# Patient Record
Sex: FEMALE | HISPANIC OR LATINO | Employment: FULL TIME | URBAN - METROPOLITAN AREA
[De-identification: names, ages, dates, MRNs, and addresses within clinical notes are randomized per-mention and may not be internally consistent; named-entity substitution may affect disease eponyms.]

---

## 2017-10-10 ENCOUNTER — HOSPITAL ENCOUNTER (OUTPATIENT)
Dept: LAB | Facility: MEDICAL CENTER | Age: 32
End: 2017-10-10
Attending: OBSTETRICS & GYNECOLOGY
Payer: COMMERCIAL

## 2017-10-10 LAB
25(OH)D3 SERPL-MCNC: 28 NG/ML (ref 30–100)
ALBUMIN SERPL BCP-MCNC: 4.2 G/DL (ref 3.2–4.9)
ALBUMIN/GLOB SERPL: 1.4 G/DL
ALP SERPL-CCNC: 52 U/L (ref 30–99)
ALT SERPL-CCNC: 15 U/L (ref 2–50)
ANION GAP SERPL CALC-SCNC: 7 MMOL/L (ref 0–11.9)
APPEARANCE UR: CLEAR
AST SERPL-CCNC: 20 U/L (ref 12–45)
BACTERIA #/AREA URNS HPF: ABNORMAL /HPF
BASOPHILS # BLD AUTO: 0.6 % (ref 0–1.8)
BASOPHILS # BLD: 0.04 K/UL (ref 0–0.12)
BILIRUB SERPL-MCNC: 0.4 MG/DL (ref 0.1–1.5)
BILIRUB UR QL STRIP.AUTO: NEGATIVE
BUN SERPL-MCNC: 15 MG/DL (ref 8–22)
CALCIUM SERPL-MCNC: 9.2 MG/DL (ref 8.5–10.5)
CHLORIDE SERPL-SCNC: 103 MMOL/L (ref 96–112)
CHOLEST SERPL-MCNC: 158 MG/DL (ref 100–199)
CO2 SERPL-SCNC: 26 MMOL/L (ref 20–33)
COLOR UR: YELLOW
CREAT SERPL-MCNC: 0.81 MG/DL (ref 0.5–1.4)
CULTURE IF INDICATED INDCX: YES UA CULTURE
EOSINOPHIL # BLD AUTO: 0.03 K/UL (ref 0–0.51)
EOSINOPHIL NFR BLD: 0.5 % (ref 0–6.9)
EPI CELLS #/AREA URNS HPF: ABNORMAL /HPF
ERYTHROCYTE [DISTWIDTH] IN BLOOD BY AUTOMATED COUNT: 44.4 FL (ref 35.9–50)
GFR SERPL CREATININE-BSD FRML MDRD: >60 ML/MIN/1.73 M 2
GLOBULIN SER CALC-MCNC: 2.9 G/DL (ref 1.9–3.5)
GLUCOSE SERPL-MCNC: 83 MG/DL (ref 65–99)
GLUCOSE UR STRIP.AUTO-MCNC: NEGATIVE MG/DL
HCT VFR BLD AUTO: 43.4 % (ref 37–47)
HDLC SERPL-MCNC: 67 MG/DL
HGB BLD-MCNC: 13.8 G/DL (ref 12–16)
HYALINE CASTS #/AREA URNS LPF: ABNORMAL /LPF
IMM GRANULOCYTES # BLD AUTO: 0.02 K/UL (ref 0–0.11)
IMM GRANULOCYTES NFR BLD AUTO: 0.3 % (ref 0–0.9)
KETONES UR STRIP.AUTO-MCNC: NEGATIVE MG/DL
LDLC SERPL CALC-MCNC: 82 MG/DL
LEUKOCYTE ESTERASE UR QL STRIP.AUTO: ABNORMAL
LYMPHOCYTES # BLD AUTO: 1.79 K/UL (ref 1–4.8)
LYMPHOCYTES NFR BLD: 28.8 % (ref 22–41)
MCH RBC QN AUTO: 28.8 PG (ref 27–33)
MCHC RBC AUTO-ENTMCNC: 31.8 G/DL (ref 33.6–35)
MCV RBC AUTO: 90.4 FL (ref 81.4–97.8)
MICRO URNS: ABNORMAL
MONOCYTES # BLD AUTO: 0.38 K/UL (ref 0–0.85)
MONOCYTES NFR BLD AUTO: 6.1 % (ref 0–13.4)
NEUTROPHILS # BLD AUTO: 3.96 K/UL (ref 2–7.15)
NEUTROPHILS NFR BLD: 63.7 % (ref 44–72)
NITRITE UR QL STRIP.AUTO: NEGATIVE
NRBC # BLD AUTO: 0 K/UL
NRBC BLD AUTO-RTO: 0 /100 WBC
PH UR STRIP.AUTO: 6.5 [PH]
PLATELET # BLD AUTO: 257 K/UL (ref 164–446)
PMV BLD AUTO: 10.5 FL (ref 9–12.9)
POTASSIUM SERPL-SCNC: 4.4 MMOL/L (ref 3.6–5.5)
PROT SERPL-MCNC: 7.1 G/DL (ref 6–8.2)
PROT UR QL STRIP: NEGATIVE MG/DL
RBC # BLD AUTO: 4.8 M/UL (ref 4.2–5.4)
RBC # URNS HPF: ABNORMAL /HPF
RBC UR QL AUTO: NEGATIVE
SODIUM SERPL-SCNC: 136 MMOL/L (ref 135–145)
SP GR UR STRIP.AUTO: 1.02
T4 FREE SERPL-MCNC: 0.81 NG/DL (ref 0.53–1.43)
TRIGL SERPL-MCNC: 45 MG/DL (ref 0–149)
TSH SERPL DL<=0.005 MIU/L-ACNC: 1.36 UIU/ML (ref 0.3–3.7)
UROBILINOGEN UR STRIP.AUTO-MCNC: 0.2 MG/DL
WBC # BLD AUTO: 6.2 K/UL (ref 4.8–10.8)
WBC #/AREA URNS HPF: ABNORMAL /HPF

## 2017-10-10 PROCEDURE — 80061 LIPID PANEL: CPT

## 2017-10-10 PROCEDURE — 82306 VITAMIN D 25 HYDROXY: CPT

## 2017-10-10 PROCEDURE — 84403 ASSAY OF TOTAL TESTOSTERONE: CPT

## 2017-10-10 PROCEDURE — 81001 URINALYSIS AUTO W/SCOPE: CPT

## 2017-10-10 PROCEDURE — 36415 COLL VENOUS BLD VENIPUNCTURE: CPT

## 2017-10-10 PROCEDURE — 87086 URINE CULTURE/COLONY COUNT: CPT

## 2017-10-10 PROCEDURE — 80053 COMPREHEN METABOLIC PANEL: CPT

## 2017-10-10 PROCEDURE — 84439 ASSAY OF FREE THYROXINE: CPT

## 2017-10-10 PROCEDURE — 84443 ASSAY THYROID STIM HORMONE: CPT

## 2017-10-10 PROCEDURE — 85025 COMPLETE CBC W/AUTO DIFF WBC: CPT

## 2017-10-10 PROCEDURE — 84270 ASSAY OF SEX HORMONE GLOBUL: CPT

## 2017-10-11 LAB
BACTERIA UR CULT: NORMAL
SIGNIFICANT IND 70042: NORMAL
SOURCE SOURCE: NORMAL

## 2017-10-13 LAB
SHBG SERPL-SCNC: 70 NMOL/L (ref 30–135)
TESTOST FREE SERPL-MCNC: 2.3 PG/ML (ref 1.3–9.2)
TESTOST SERPL-MCNC: 22 NG/DL (ref 9–55)

## 2018-01-27 ENCOUNTER — OFFICE VISIT (OUTPATIENT)
Dept: URGENT CARE | Facility: PHYSICIAN GROUP | Age: 33
End: 2018-01-27
Payer: COMMERCIAL

## 2018-01-27 VITALS
DIASTOLIC BLOOD PRESSURE: 66 MMHG | HEART RATE: 91 BPM | SYSTOLIC BLOOD PRESSURE: 92 MMHG | OXYGEN SATURATION: 98 % | RESPIRATION RATE: 16 BRPM | HEIGHT: 63 IN | WEIGHT: 139 LBS | TEMPERATURE: 98.5 F | BODY MASS INDEX: 24.63 KG/M2

## 2018-01-27 DIAGNOSIS — J06.9 UPPER RESPIRATORY TRACT INFECTION, UNSPECIFIED TYPE: ICD-10-CM

## 2018-01-27 DIAGNOSIS — R07.89 CHEST WALL PAIN: ICD-10-CM

## 2018-01-27 DIAGNOSIS — R05.9 COUGH: ICD-10-CM

## 2018-01-27 PROCEDURE — 99214 OFFICE O/P EST MOD 30 MIN: CPT | Performed by: NURSE PRACTITIONER

## 2018-01-27 ASSESSMENT — ENCOUNTER SYMPTOMS
SWEATS: 0
SHORTNESS OF BREATH: 0
COUGH: 1
HEMOPTYSIS: 0
HEARTBURN: 0
RHINORRHEA: 1
WEIGHT LOSS: 0
CHILLS: 1
FEVER: 0
HEADACHES: 0
MYALGIAS: 0
WHEEZING: 0
SORE THROAT: 0

## 2018-01-27 ASSESSMENT — COPD QUESTIONNAIRES: COPD: 0

## 2018-01-28 NOTE — PROGRESS NOTES
"Subjective:      Amy Charles is a 32 y.o. female who presents with Cough (C/o unproductive cough, headache, chills, sweats, LT ear pain x2-3 wks off/on.  Chest pain possibly from cough onset last night.)      Denies past medical, surgical or family history that is significant to today's problem.   RX or OTC medications reviewed with patient today.   No Known Allergies          Cough   This is a new problem. The current episode started in the past 7 days. The problem has been unchanged. The cough is non-productive. Associated symptoms include chest pain (anterior chest wall pain 4/10 last night  NONE at this time. ), chills, nasal congestion and rhinorrhea. Pertinent negatives include no ear congestion, ear pain, fever, headaches, heartburn, hemoptysis, myalgias, postnasal drip, rash, sore throat, shortness of breath, sweats, weight loss or wheezing. Nothing aggravates the symptoms. She has tried OTC cough suppressant for the symptoms. The treatment provided mild relief. There is no history of asthma, bronchiectasis, bronchitis, COPD, emphysema, environmental allergies or pneumonia.       Review of Systems   Constitutional: Positive for chills. Negative for fever and weight loss.   HENT: Positive for rhinorrhea. Negative for ear pain, postnasal drip and sore throat.    Respiratory: Positive for cough. Negative for hemoptysis, shortness of breath and wheezing.    Cardiovascular: Positive for chest pain (anterior chest wall pain 4/10 last night  NONE at this time. ).   Gastrointestinal: Negative for heartburn.   Musculoskeletal: Negative for myalgias.   Skin: Negative for rash.   Neurological: Negative for headaches.   Endo/Heme/Allergies: Negative for environmental allergies.          Objective:     BP (!) 92/66   Pulse 91   Temp 36.9 °C (98.5 °F)   Resp 16   Ht 1.6 m (5' 3\")   Wt 63 kg (139 lb)   SpO2 98%   Breastfeeding? No   BMI 24.62 kg/m²      Physical Exam   Constitutional: She is oriented " to person, place, and time. Vital signs are normal. She appears well-developed and well-nourished.  Non-toxic appearance. She does not have a sickly appearance. She does not appear ill. No distress.   HENT:   Head: Normocephalic.   Right Ear: Hearing, external ear and ear canal normal. Tympanic membrane is injected. Tympanic membrane is not erythematous. No middle ear effusion.   Left Ear: Hearing, external ear and ear canal normal. Tympanic membrane is injected. Tympanic membrane is not erythematous.  No middle ear effusion.   Nose: Rhinorrhea present. No mucosal edema. Right sinus exhibits no maxillary sinus tenderness and no frontal sinus tenderness. Left sinus exhibits no maxillary sinus tenderness and no frontal sinus tenderness.   Mouth/Throat: Uvula is midline. Posterior oropharyngeal erythema present. No oropharyngeal exudate, posterior oropharyngeal edema or tonsillar abscesses.   Eyes: Pupils are equal, round, and reactive to light.   Neck: Trachea normal, normal range of motion and full passive range of motion without pain. Neck supple.   Cardiovascular: Normal rate and regular rhythm.  PMI is not displaced.    Pulmonary/Chest: Effort normal and breath sounds normal. No accessory muscle usage. No tachypnea. No respiratory distress. She has no decreased breath sounds. She has no wheezes. She has no rhonchi. She has no rales. She exhibits tenderness. She exhibits no crepitus, no edema and no swelling.       Abdominal: Soft. Normal appearance. There is no tenderness.   Musculoskeletal: Normal range of motion.   Lymphadenopathy:        Head (right side): No submental, no submandibular and no tonsillar adenopathy present.        Head (left side): No submental, no submandibular and no tonsillar adenopathy present.     She has no cervical adenopathy.        Right: No supraclavicular adenopathy present.        Left: No supraclavicular adenopathy present.   Neurological: She is alert and oriented to person, place,  and time. She has normal strength. Gait normal.   Skin: Skin is warm and dry.   Psychiatric: She has a normal mood and affect. Her speech is normal and behavior is normal. Thought content normal.   Nursing note and vitals reviewed.        EKG Interpretation    Interpreted by me    Rhythm: normal sinus   Rate: normal  Axis: normal  Ectopy: none  Conduction: normal  ST Segments: no acute change  T Waves: no acute change  Q Waves: none    Clinical Impression: no acute changes           Assessment/Plan:     1. Upper respiratory tract infection, unspecified type      2. Chest wall pain      3. Cough      Discussed that I felt this was viral in nature. Did not see any evidence of a bacterial process. Discussed natural progression and sx care.  Increase fluids  Increase rest  Keep well hydrated  NSAIDS OTC   Fu PRN

## 2018-05-23 ENCOUNTER — TELEPHONE (OUTPATIENT)
Dept: MEDICAL GROUP | Facility: PHYSICIAN GROUP | Age: 33
End: 2018-05-23

## 2018-05-23 NOTE — TELEPHONE ENCOUNTER
Future Appointments       Provider Department [place]    5/24/2018 5:00 PM Laura Cornell M.D. [facility]    8/31/2018 2:00 PM Laura Cornell M.D. [facility]        ESTABLISHED PATIENT PRE-VISIT PLANNING     Note: Patient will not be contacted if there is no indication to call.     1.  Reviewed notes from the last few office visits within the medical group: Yes    2.  If any orders were placed at last visit or intended to be done for this visit (i.e. 6 mos follow-up), do we have Results/Consult Notes?        •  Labs - Labs ordered, completed on 10/10/17 and results are in chart.       •  Imaging - Imaging was not ordered at last office visit.       •  Referrals - No referrals were ordered at last office visit.    3. Is this appointment scheduled as a Hospital Follow-Up? No    4.  Immunizations were updated in [employer] using WebIZ?: Yes       •  Web Iz Recommendations: FLU, HEPATITIS A  and VARICELLA (Chicken Pox)     5.  Patient is due for the following Health Maintenance Topics:   Health Maintenance Due   Topic Date Due   • PAP SMEAR  12/20/2017     6.  MDX printed for Provider? NO INS UMR     7.  Patient was informed to arrive 15 min prior to their scheduled appointment and bring in their medication bottles. Confirmed through automated call

## 2018-05-24 ENCOUNTER — OFFICE VISIT (OUTPATIENT)
Dept: MEDICAL GROUP | Facility: PHYSICIAN GROUP | Age: 33
End: 2018-05-24
Payer: COMMERCIAL

## 2018-05-24 VITALS
BODY MASS INDEX: 24.98 KG/M2 | HEART RATE: 64 BPM | SYSTOLIC BLOOD PRESSURE: 96 MMHG | WEIGHT: 141 LBS | DIASTOLIC BLOOD PRESSURE: 58 MMHG | OXYGEN SATURATION: 99 % | TEMPERATURE: 98.4 F | HEIGHT: 63 IN

## 2018-05-24 DIAGNOSIS — Z00.00 HEALTHCARE MAINTENANCE: ICD-10-CM

## 2018-05-24 DIAGNOSIS — M54.42 CHRONIC BILATERAL LOW BACK PAIN WITH BILATERAL SCIATICA: ICD-10-CM

## 2018-05-24 DIAGNOSIS — G89.29 CHRONIC BILATERAL LOW BACK PAIN WITH BILATERAL SCIATICA: ICD-10-CM

## 2018-05-24 DIAGNOSIS — R19.6 HALITOSIS: ICD-10-CM

## 2018-05-24 DIAGNOSIS — M54.41 CHRONIC BILATERAL LOW BACK PAIN WITH BILATERAL SCIATICA: ICD-10-CM

## 2018-05-24 DIAGNOSIS — R05.9 COUGH IN ADULT: ICD-10-CM

## 2018-05-24 DIAGNOSIS — E55.9 VITAMIN D DEFICIENCY: ICD-10-CM

## 2018-05-24 PROCEDURE — 99204 OFFICE O/P NEW MOD 45 MIN: CPT | Performed by: INTERNAL MEDICINE

## 2018-05-24 RX ORDER — ERGOCALCIFEROL 1.25 MG/1
50000 CAPSULE ORAL
Qty: 12 CAP | Refills: 3 | Status: SHIPPED | OUTPATIENT
Start: 2018-05-24 | End: 2018-11-28

## 2018-05-24 RX ORDER — BROMPHENIRAMINE MALEATE, PSEUDOEPHEDRINE HYDROCHLORIDE, AND DEXTROMETHORPHAN HYDROBROMIDE 2; 30; 10 MG/5ML; MG/5ML; MG/5ML
5 SYRUP ORAL 3 TIMES DAILY PRN
Qty: 840 ML | Refills: 1 | Status: SHIPPED | OUTPATIENT
Start: 2018-05-24 | End: 2018-08-31

## 2018-05-24 ASSESSMENT — PATIENT HEALTH QUESTIONNAIRE - PHQ9: CLINICAL INTERPRETATION OF PHQ2 SCORE: 0

## 2018-05-25 NOTE — ASSESSMENT & PLAN NOTE
This is a chronic health problem that is well controlled with current medications and lifestyle measures. Had PT in the past and on PRN Ibuprofen. Had MRI in 2015 positive for Minimal lumbar spondylotic change at the L5-S1 level.

## 2018-05-25 NOTE — PROGRESS NOTES
CC:  To establish care with new PCP, darby.    HISTORY OF THE PRESENT ILLNESS: Patient is a 32 y.o. female. This pleasant patient is here today to establish care with new PCP and discuss on her medical conditions as mentioned in history of below.    Health Maintenance: Completed      Cough in adult  Recent URI with pharyngitis for 3 weeks. Having residual cough. Productive cough which is white in color. Denies fever, chills or dyspnea.    Chronic low back pain  This is a chronic health problem that is well controlled with current medications and lifestyle measures. Had PT in the past and on PRN Ibuprofen. Had MRI in 2015 positive for Minimal lumbar spondylotic change at the L5-S1 level.    Halitosis  This is a new problem for many years. Had dental check 2-3 months back which was normal.    Vitamin D deficiency  Last blood work in 10/2017 was low. Not on any supplements.    Healthcare maintenance  Pap smear at this time, already had IUD placement done with gynecology. We will be setting up an appointment with gynecologist.    PHQ score 0, BMI within normal limits, no tobacco, no fall injuries    Allergies: Patient has no known allergies.    Current Outpatient Prescriptions Ordered in [facility]   Medication Sig Dispense Refill   • brompheniramine-pseudoephedrine-DM 30-2-10 MG/5ML syrup Take 5 mL by mouth 3 times a day as needed. 840 mL 1   • vitamin D, Ergocalciferol, (DRISDOL) 26891 units Cap capsule Take 1 Cap by mouth every 7 days. 12 Cap 3   • ibuprofen (MOTRIN) 600 MG TABS Take 1 Tab by mouth every 6 hours as needed (Cramping). 30 Tab 1     No current Epic-ordered facility-administered medications on file.        Past Medical History:   Diagnosis Date   • Anxiety    • Depression    • Headache(784.0)    • IBD (inflammatory bowel disease)        Past Surgical History:   Procedure Laterality Date   • DENTAL EXTRACTION(S)         Social History   Substance Use Topics   • Smoking status: Never Smoker   • Smokeless  tobacco: Never Used   • Alcohol use No      Comment: rarely       Social History     Social History Narrative   • No narrative on file       Family History   Problem Relation Age of Onset   • Hypertension Mother    • Hyperlipidemia Mother    • Heart Disease Mother    • Diabetes Maternal Aunt    • Heart Disease Maternal Aunt    • Hypertension Maternal Aunt    • Hyperlipidemia Maternal Aunt    • Hyperlipidemia Maternal Uncle    • Hypertension Maternal Uncle    • Heart Disease Maternal Uncle    • Diabetes Maternal Uncle    • Arthritis Maternal Grandmother    • Diabetes Maternal Grandmother    • Hypertension Maternal Grandmother    • Hyperlipidemia Maternal Grandmother    • Heart Disease Maternal Grandmother    • Lung Disease Maternal Grandfather        ROS:     - Constitutional: Negative for fever, chills, unexpected weight change, and fatigue/generalized weakness.     - HEENT: Negative for headaches, vision changes, hearing changes, ear pain, ear discharge, rhinorrhea, sinus congestion, sore throat, and neck pain.      - Respiratory: Positive for productive cough with clear sputum. Negative for chest congestion, dyspnea, wheezing, and crackles.      - Cardiovascular: Negative for chest pain, palpitations, orthopnea, and bilateral lower extremity edema.     - Gastrointestinal: Negative for heartburn, nausea, vomiting, abdominal pain, hematochezia, melena, diarrhea, constipation, and greasy/foul-smelling stools.     - Genitourinary: Negative for dysuria, polyuria, hematuria, pyuria, urinary urgency, and urinary incontinence.     - Musculoskeletal: Negative for myalgias, back pain, and joint pain.     - Skin: Negative for rash, itching, cyanotic skin color change.     - Neurological: Negative for dizziness, tingling, tremors, focal sensory deficit, focal weakness and headaches.     - Endo/Heme/Allergies: Does not bruise/bleed easily.     - Psychiatric/Behavioral: Negative for depression, suicidal/homicidal ideation and  "memory loss.      Last lab work done in October 2017 reviewed and discussed with the patient.    Exam: Blood pressure (!) 96/58, pulse 64, temperature 36.9 °C (98.4 °F), height 1.6 m (5' 3\"), weight 64 kg (141 lb), SpO2 99 %. Body mass index is 24.98 kg/m².    General: Normal appearing. No distress.  HEENT: Normocephalic. Eyes conjunctiva clear lids without ptosis, pupils equal and reactive to light accommodation, ears normal shape and contour, canals are clear bilaterally, tympanic membranes are benign, nasal mucosa benign, oropharynx is without erythema, edema or exudates.   Neck: Supple without JVD or bruit. Thyroid is not enlarged.  Pulmonary: Clear to ausculation.  Normal effort. No rales, ronchi, or wheezing.  Cardiovascular: Regular rate and rhythm without murmur. Carotid and radial pulses are intact and equal bilaterally.  Abdomen: Soft, nontender, nondistended. Normal bowel sounds. Liver and spleen are not palpable  Neurologic: Grossly nonfocal  Lymph: No cervical, supraclavicular or axillary lymph nodes are palpable  Skin: Warm and dry.  No obvious lesions.  Musculoskeletal: Normal gait. No extremity cyanosis, clubbing, or edema.  Psych: Normal mood and affect. Alert and oriented x3. Judgment and insight is normal.      Please note that this dictation was created using voice recognition software. I have made every reasonable attempt to correct obvious errors, but I expect that there are errors of grammar and possibly content that I did not discover before finalizing the note.      Assessment/Plan  1. Halitosis  No dental infections as per my exam, given the symptoms will check for H. pylori antibody.  - H.PYLORI AB SCREEN; Future    2. Cough in adult  Residual cough after the recent upper respiratory infection, will give her symptomatic treatment.  - brompheniramine-pseudoephedrine-DM 30-2-10 MG/5ML syrup; Take 5 mL by mouth 3 times a day as needed.  Dispense: 840 mL; Refill: 1    3. Chronic bilateral low " back pain with bilateral sciatica  Continue when necessary ibuprofen 600 mg 3 times a day.    4. Vitamin D deficiency  Given vitamin D deficiency in the last lab work patient not on any supplements will give her high-dose vitamin D at this time.  - vitamin D, Ergocalciferol, (DRISDOL) 96438 units Cap capsule; Take 1 Cap by mouth every 7 days.  Dispense: 12 Cap; Refill: 3

## 2018-05-25 NOTE — ASSESSMENT & PLAN NOTE
Pap smear at this time, already had IUD placement done with gynecology. We will be setting up an appointment with gynecologist.

## 2018-05-25 NOTE — ASSESSMENT & PLAN NOTE
Recent URI with pharyngitis for 3 weeks. Having residual cough. Productive cough which is white in color. Denies fever, chills or dyspnea.

## 2018-05-29 DIAGNOSIS — R19.6 HALITOSIS: ICD-10-CM

## 2018-06-02 ENCOUNTER — HOSPITAL ENCOUNTER (OUTPATIENT)
Dept: LAB | Facility: MEDICAL CENTER | Age: 33
End: 2018-06-02
Attending: INTERNAL MEDICINE
Payer: COMMERCIAL

## 2018-06-02 DIAGNOSIS — R19.6 HALITOSIS: ICD-10-CM

## 2018-06-02 PROCEDURE — 83013 H PYLORI (C-13) BREATH: CPT

## 2018-06-05 LAB — UREA BREATH TEST QL: NEGATIVE

## 2018-06-06 ENCOUNTER — TELEPHONE (OUTPATIENT)
Dept: MEDICAL GROUP | Facility: PHYSICIAN GROUP | Age: 33
End: 2018-06-06

## 2018-06-06 NOTE — LETTER
June 11, 2018        Amy Charles  [address]  [facility] [address]        Dear Amy:    Your H.Pylori breath test is negative.      If you have any questions or concerns, please don't hesitate to call.        Sincerely,      Laura Cornell M.D.      Electronically Signed

## 2018-06-06 NOTE — TELEPHONE ENCOUNTER
----- Message from Laura Cornell M.D. sent at 6/5/2018 11:59 PM PDT -----  Your H.Pylori breath test is negative.  Laura Cornell M.D.

## 2018-08-30 ENCOUNTER — TELEPHONE (OUTPATIENT)
Dept: MEDICAL GROUP | Facility: PHYSICIAN GROUP | Age: 33
End: 2018-08-30

## 2018-08-30 NOTE — TELEPHONE ENCOUNTER
Future Appointments       Provider Department [facility]    8/31/2018 2:20 PM Laura Cornell M.D. [facility]        ESTABLISHED PATIENT PRE-VISIT PLANNING     Note: Patient will not be contacted if there is no indication to call.     1.  Reviewed notes from the last few office visits within the medical group: Yes    2.  If any orders were placed at last visit or intended to be done for this visit (i.e. 6 mos follow-up), do we have Results/Consult Notes?        •  Labs - Labs ordered, NOT completed. Patient advised to complete prior to next appointment.       •  Imaging - Imaging was not ordered at last office visit.       •  Referrals - No referrals were ordered at last office visit.    3. Is this appointment scheduled as a Hospital Follow-Up? No    4.  Immunizations were updated in [employer] using WebIZ?: Yes       •  Web Iz Recommendations: FLU, HEPATITIS A  and VARICELLA (Chicken Pox)     5.  Patient is due for the following Health Maintenance Topics:   Health Maintenance Due   Topic Date Due   • PAP SMEAR  12/20/2017   • IMM INFLUENZA (1) 09/01/2018       6.  MDX printed for Provider? NO INS UMR     7.  Patient was informed to arrive 15 min prior to their scheduled appointment and bring in their medication bottles. Confirmed through automated call

## 2018-08-31 ENCOUNTER — OFFICE VISIT (OUTPATIENT)
Dept: MEDICAL GROUP | Facility: PHYSICIAN GROUP | Age: 33
End: 2018-08-31
Payer: COMMERCIAL

## 2018-08-31 VITALS
DIASTOLIC BLOOD PRESSURE: 58 MMHG | SYSTOLIC BLOOD PRESSURE: 94 MMHG | HEIGHT: 63 IN | WEIGHT: 138 LBS | HEART RATE: 60 BPM | TEMPERATURE: 98.9 F | BODY MASS INDEX: 24.45 KG/M2 | OXYGEN SATURATION: 98 %

## 2018-08-31 DIAGNOSIS — K58.2 IRRITABLE BOWEL SYNDROME WITH BOTH CONSTIPATION AND DIARRHEA: ICD-10-CM

## 2018-08-31 DIAGNOSIS — M51.36 DDD (DEGENERATIVE DISC DISEASE), LUMBAR: ICD-10-CM

## 2018-08-31 DIAGNOSIS — Z97.5 IUD (INTRAUTERINE DEVICE) IN PLACE: ICD-10-CM

## 2018-08-31 DIAGNOSIS — M21.611 BUNION OF GREAT TOE OF RIGHT FOOT: ICD-10-CM

## 2018-08-31 DIAGNOSIS — J35.01 CHRONIC TONSILLITIS: ICD-10-CM

## 2018-08-31 PROBLEM — R05.9 COUGH IN ADULT: Status: RESOLVED | Noted: 2018-05-24 | Resolved: 2018-08-31

## 2018-08-31 PROBLEM — R10.9 CHRONIC ABDOMINAL PAIN: Status: ACTIVE | Noted: 2018-08-31

## 2018-08-31 PROBLEM — K59.00 CONSTIPATION: Status: ACTIVE | Noted: 2018-08-31

## 2018-08-31 PROBLEM — G89.29 CHRONIC ABDOMINAL PAIN: Status: ACTIVE | Noted: 2018-08-31

## 2018-08-31 PROCEDURE — 99214 OFFICE O/P EST MOD 30 MIN: CPT | Performed by: INTERNAL MEDICINE

## 2018-08-31 RX ORDER — POLYETHYLENE GLYCOL 3350 17 G/17G
17 POWDER, FOR SOLUTION ORAL DAILY
Qty: 10 EACH | Refills: 3 | Status: SHIPPED | OUTPATIENT
Start: 2018-08-31 | End: 2020-01-24

## 2018-08-31 NOTE — ASSESSMENT & PLAN NOTE
This is a chronic health problem that is well controlled with current medications and lifestyle measures. Patient is having greater than 3 episodes of recurrent tonsillitis and requesting for removal of the tonsils.

## 2018-08-31 NOTE — PATIENT INSTRUCTIONS
Irritable Bowel Syndrome, Adult  Irritable bowel syndrome (IBS) is not one specific disease. It is a group of symptoms that affects the organs responsible for digestion (gastrointestinal or GI tract).  To regulate how your GI tract works, your body sends signals back and forth between your intestines and your brain. If you have IBS, there may be a problem with these signals. As a result, your GI tract does not function normally. Your intestines may become more sensitive and overreact to certain things. This is especially true when you eat certain foods or when you are under stress.  There are four types of IBS. These may be determined based on the consistency of your stool:  · IBS with diarrhea.  · IBS with constipation.  · Mixed IBS.  · Unsubtyped IBS.  It is important to know which type of IBS you have. Some treatments are more likely to be helpful for certain types of IBS.  What are the causes?  The exact cause of IBS is not known.  What increases the risk?  You may have a higher risk of IBS if:  · You are a woman.  · You are younger than 45 years old.  · You have a family history of IBS.  · You have mental health problems.  · You have had bacterial infection of your GI tract.  What are the signs or symptoms?  Symptoms of IBS vary from person to person. The main symptom is abdominal pain or discomfort. Additional symptoms usually include one or more of the following:  · Diarrhea, constipation, or both.  · Abdominal swelling or bloating.  · Feeling full or sick after eating a small or regular-size meal.  · Frequent gas.  · Mucus in the stool.  · A feeling of having more stool left after a bowel movement.  Symptoms tend to come and go. They may be associated with stress, psychiatric conditions, or nothing at all.  How is this diagnosed?  There is no specific test to diagnose IBS. Your health care provider will make a diagnosis based on a physical exam, medical history, and your symptoms. You may have other tests to  rule out other conditions that may be causing your symptoms. These may include:  · Blood tests.  · X-rays.  · CT scan.  · Endoscopy and colonoscopy. This is a test in which your GI tract is viewed with a long, thin, flexible tube.  How is this treated?  There is no cure for IBS, but treatment can help relieve symptoms. IBS treatment often includes:  · Changes to your diet, such as:  ¨ Eating more fiber.  ¨ Avoiding foods that cause symptoms.  ¨ Drinking more water.  ¨ Eating regular, medium-sized portioned meals.  · Medicines. These may include:  ¨ Fiber supplements if you have constipation.  ¨ Medicine to control diarrhea (antidiarrheal medicines).  ¨ Medicine to help control muscle spasms in your GI tract (antispasmodic medicines).  ¨ Medicines to help with any mental health issues, such as antidepressants or tranquilizers.  · Therapy.  ¨ Talk therapy may help with anxiety, depression, or other mental health issues that can make IBS symptoms worse.  · Stress reduction.  ¨ Managing your stress can help keep symptoms under control.  Follow these instructions at home:  · Take medicines only as directed by your health care provider.  · Eat a healthy diet.  ¨ Avoid foods and drinks with added sugar.  ¨ Include more whole grains, fruits, and vegetables gradually into your diet. This may be especially helpful if you have IBS with constipation.  ¨ Avoid any foods and drinks that make your symptoms worse. These may include dairy products and caffeinated or carbonated drinks.  ¨ Do not eat large meals.  ¨ Drink enough fluid to keep your urine clear or pale yellow.  · Exercise regularly. Ask your health care provider for recommendations of good activities for you.  · Keep all follow-up visits as directed by your health care provider. This is important.  Contact a health care provider if:  · You have constant pain.  · You have trouble or pain with swallowing.  · You have worsening diarrhea.  Get help right away if:  · You  have severe and worsening abdominal pain.  · You have diarrhea and:  ¨ You have a rash, stiff neck, or severe headache.  ¨ You are irritable, sleepy, or difficult to awaken.  ¨ You are weak, dizzy, or extremely thirsty.  · You have bright red blood in your stool or you have black tarry stools.  · You have unusual abdominal swelling that is painful.  · You vomit continuously.  · You vomit blood (hematemesis).  · You have both abdominal pain and a fever.  This information is not intended to replace advice given to you by your health care provider. Make sure you discuss any questions you have with your health care provider.  Document Released: 12/18/2006 Document Revised: 05/19/2017 Document Reviewed: 09/04/2015  [employer] Patient Education © 2017 [employer]vier Inc.  ================    Diet for Irritable Bowel Syndrome  When you have irritable bowel syndrome (IBS), the foods you eat and your eating habits are very important. IBS may cause various symptoms, such as abdominal pain, constipation, or diarrhea. Choosing the right foods can help ease discomfort caused by these symptoms. Work with your health care provider and dietitian to find the best eating plan to help control your symptoms.  WHAT GENERAL GUIDELINES DO I NEED TO FOLLOW?  · Keep a food diary. This will help you identify foods that cause symptoms. Write down:  ¨ What you eat and when.  ¨ What symptoms you have.  ¨ When symptoms occur in relation to your meals.  · Avoid foods that cause symptoms. Talk with your dietitian about other ways to get the same nutrients that are in these foods.  · Eat more foods that contain fiber. Take a fiber supplement if directed by your dietitian.  · Eat your meals slowly, in a relaxed setting.  · Aim to eat 5-6 small meals per day. Do not skip meals.  · Drink enough fluids to keep your urine clear or pale yellow.  · Ask your health care provider if you should take an over-the-counter probiotic during flare-ups to help  restore healthy gut bacteria.  · If you have cramping or diarrhea, try making your meals low in fat and high in carbohydrates. Examples of carbohydrates are pasta, rice, whole grain breads and cereals, fruits, and vegetables.  · If dairy products cause your symptoms to flare up, try eating less of them. You might be able to handle yogurt better than other dairy products because it contains bacteria that help with digestion.  WHAT FOODS ARE NOT RECOMMENDED?  The following are some foods and drinks that may worsen your symptoms:  · Fatty foods, such as French fries.  · Milk products, such as cheese or ice cream.  · Chocolate.  · Alcohol.  · Products with caffeine, such as coffee.  · Carbonated drinks, such as soda.  The items listed above may not be a complete list of foods and beverages to avoid. Contact your dietitian for more information.  WHAT FOODS ARE GOOD SOURCES OF FIBER?  Your health care provider or dietitian may recommend that you eat more foods that contain fiber. Fiber can help reduce constipation and other IBS symptoms. Add foods with fiber to your diet a little at a time so that your body can get used to them. Too much fiber at once might cause gas and swelling of your abdomen. The following are some foods that are good sources of fiber:  · Apples.  · Peaches.  · Pears.  · Berries.  · Figs.  · Broccoli (raw).  · Cabbage.  · Carrots.  · Raw peas.  · Kidney beans.  · Lima beans.  · Whole grain bread.  · Whole grain cereal.  FOR MORE INFORMATION   International Foundation for Functional Gastrointestinal Disorders: www.iffgd.org  National [place] of Diabetes and Digestive and Kidney Diseases: www.niddk.nih.gov/health-information/health-topics/digestive-diseases/ibs/Pages/facts.aspx     This information is not intended to replace advice given to you by your health care provider. Make sure you discuss any questions you have with your health care provider.     Document Released: 03/09/2005 Document Revised:  01/08/2016 Document Reviewed: 03/20/2015  Elsevier Interactive Patient Education ©2016 [employer]    ==============================    You tube MINDFULNESS exercises.  =============================

## 2018-08-31 NOTE — ASSESSMENT & PLAN NOTE
This is a chronic health problem that is well controlled with current medications and lifestyle measures. Last MRI in 2015 positive for Minimal lumbar spondylotic change at the L5-S1 level. Currently on Ibuprofen 600 mg daily PRN needing rarely. Had a PT session in 2015 but not practicing at home

## 2018-08-31 NOTE — ASSESSMENT & PLAN NOTE
This is a chronic health problem that is uncontrolled with current medications and lifestyle measures. Mostly lower abdomen. Has Constipation and diarrhea alternatively.

## 2018-08-31 NOTE — PROGRESS NOTES
CC: Follow-up visit, constipation.    HISTORY OF PRESENT ILLNESS: Patient is a 33 y.o. female established patient who presents today to discuss her medical conditions as mentioned in history of present illness below.    Health Maintenance: Completed    DDD (degenerative disc disease), lumbar  This is a chronic health problem that is well controlled with current medications and lifestyle measures. Last MRI in 2015 positive for Minimal lumbar spondylotic change at the L5-S1 level. Currently on Ibuprofen 600 mg daily PRN needing rarely. Had a PT session in 2015 but not practicing at home     IUD (intrauterine device) in place  Placed IUD this year Mirena good for 5 yrs. Follows  Gynecologist    Healthcare maintenance  PAP smear done recently this year 05/2018 will get the records.    Constipation  This is a chronic health problem that is uncontrolled with current medications and lifestyle measures. Takes fiber food from grocery. Requesting referral to GI . [facility] had colonoscopy in 3 yrs back , and was Colitis as per the patient. Should get the records.    Irritable bowel syndrome with both constipation and diarrhea  This is a chronic health problem that is uncontrolled with current medications and lifestyle measures. Mostly lower abdomen. Has Constipation and diarrhea alternatively.     Chronic tonsillitis  This is a chronic health problem that is well controlled with current medications and lifestyle measures. Patient is having greater than 3 episodes of recurrent tonsillitis and requesting for removal of the tonsils.      PHQ score 0, BMI within normal limits, no tobacco, no fall injuries.    Patient Active Problem List    Diagnosis Date Noted   • IUD (intrauterine device) in place 08/31/2018   • Irritable bowel syndrome with both constipation and diarrhea 08/31/2018   • Chronic tonsillitis 08/31/2018   • Halitosis 05/24/2018   • Vitamin D deficiency 05/24/2018   • Healthcare maintenance  05/24/2018   • Lumbosacral spondylosis 02/25/2016   • DDD (degenerative disc disease), lumbar 02/25/2016   • Chronic low back pain 02/25/2016   • Lumbar radiculopathy 02/25/2016      Allergies:Patient has no known allergies.    Current Outpatient Prescriptions   Medication Sig Dispense Refill   • polyethylene glycol/lytes (MIRALAX) Pack Take 1 Packet by mouth every day. 10 Each 3   • vitamin D, Ergocalciferol, (DRISDOL) 73536 units Cap capsule Take 1 Cap by mouth every 7 days. 12 Cap 3   • ibuprofen (MOTRIN) 600 MG TABS Take 1 Tab by mouth every 6 hours as needed (Cramping). 30 Tab 1     No current facility-administered medications for this visit.        Social History   Substance Use Topics   • Smoking status: Never Smoker   • Smokeless tobacco: Never Used   • Alcohol use No      Comment: rarely     Social History     Social History Narrative   • No narrative on file       Family History   Problem Relation Age of Onset   • Hypertension Mother    • Hyperlipidemia Mother    • Heart Disease Mother    • Diabetes Maternal Aunt    • Heart Disease Maternal Aunt    • Hypertension Maternal Aunt    • Hyperlipidemia Maternal Aunt    • Hyperlipidemia Maternal Uncle    • Hypertension Maternal Uncle    • Heart Disease Maternal Uncle    • Diabetes Maternal Uncle    • Arthritis Maternal Grandmother    • Diabetes Maternal Grandmother    • Hypertension Maternal Grandmother    • Hyperlipidemia Maternal Grandmother    • Heart Disease Maternal Grandmother    • Lung Disease Maternal Grandfather         ROS:     - Constitutional:  Negative for fever, chills, unexpected weight change, and fatigue/generalized weakness.    - HEENT:  Negative for headaches, vision changes, hearing changes, ear pain, ear discharge, rhinorrhea, sinus congestion, sore throat, and neck pain.      - Respiratory: Negative for cough, sputum production, chest congestion, dyspnea, wheezing, and crackles.      - Cardiovascular: Negative for chest pain, palpitations,  "orthopnea, and bilateral lower extremity edema.     - Gastrointestinal: Positive for alternating constipation and diarrhea with associated intermittent abdominal pain Negative for heartburn, nausea, vomiting,hematochezia, melena and greasy/foul-smelling stools.     - Genitourinary: Negative for dysuria, polyuria, hematuria, pyuria, urinary urgency, and urinary incontinence.     - Musculoskeletal: Positive for intermittent back pain Negative for myalgias and joint pain.     - Skin: Negative for rash, itching, cyanotic skin color change.     - Neurological: Negative for dizziness, tingling, tremors, focal sensory deficit, focal weakness and headaches.     - Endo/Heme/Allergies: Does not bruise/bleed easily.     - Psychiatric/Behavioral: Negative for depression, suicidal/homicidal ideation and memory loss.        Last lab work done in October 2017 reviewed and discussed with the patient. Not due for new lab work until October 2018.    Exam:    Blood pressure (!) 94/58, pulse 60, temperature 37.2 °C (98.9 °F), height 1.6 m (5' 3\"), weight 62.6 kg (138 lb), SpO2 98 %. Body mass index is 24.45 kg/m².    General:  Well nourished, well developed female in NAD  Head is grossly normal. Positive for bilateral tonsillar enlargement, no erythema or swelling.  Neck: Supple without JVD or bruit. Thyroid is not enlarged. Positive for bilateral jugulodigastric lymph node enlargement.  Pulmonary: Clear to ausculation and percussion.  Normal effort. No rales, ronchi, or wheezing.  Cardiovascular: Regular rate and rhythm without murmur. Carotid and radial pulses are intact and equal bilaterally.  Extremities: no clubbing, cyanosis, or edema. Positive for right great toe bunion.   Abdominal exam : Soft, tenderness in the left lower quadrant and right lower quadrant and also hypogastric regions. No rigidity.      Please note that this dictation was created using voice recognition software. I have made every reasonable attempt to correct " obvious errors, but I expect that there are errors of grammar and possibly content that I did not discover before finalizing the note.    Assessment/Plan:  1. Irritable bowel syndrome with both constipation and diarrhea  Uncontrolled, offered her new medication Linzess which she refuses to start. She wants to go back to her gastroenterology at [facility] where she had a previous colonoscopy and wants to follow only the recommendations. We will give her MiraLAX whenever she has constipation, new referral to gastroenterology as it was more than 3 years.- REFERRAL TO GASTROENTEROLOGY  - polyethylene glycol/lytes (MIRALAX) Pack; Take 1 Packet by mouth every day.  Dispense: 10 Each; Refill: 3    2. Chronic tonsillitis  Stable, given her recurrent tonsillitis more than 3 times a year we will do referral to ENT for possible need of removal.  - REFERRAL TO ENT    3. DDD (degenerative disc disease), lumbar  Continue over-the-counter ibuprofen when necessary for pain. Given instructions to practice the physiotherapy exercises which was already talked to her in 2015. Offered her new physical therapy referral which she refused.    4. IUD (intrauterine device) in place  Follows with gynecology when replacement if needed.    5. Bunion of great toe of right foot  Uncontrolled pain in the bunion, given instructions on the change of footwear. Will give referral to podiatry for further management.  - REFERRAL TO PODIATRY

## 2018-08-31 NOTE — ASSESSMENT & PLAN NOTE
This is a chronic health problem that is uncontrolled with current medications and lifestyle measures. Takes fiber food from grocery. Requesting referral to GI . [facility] had colonoscopy in 3 yrs back , and was Colitis as per the patient. Should get the records.

## 2018-10-03 ENCOUNTER — IMMUNIZATION (OUTPATIENT)
Dept: SOCIAL WORK | Facility: CLINIC | Age: 33
End: 2018-10-03

## 2018-10-03 DIAGNOSIS — Z23 NEED FOR VACCINATION: ICD-10-CM

## 2018-10-03 PROCEDURE — 90686 IIV4 VACC NO PRSV 0.5 ML IM: CPT | Performed by: REGISTERED NURSE

## 2018-11-28 ENCOUNTER — APPOINTMENT (OUTPATIENT)
Dept: ADMISSIONS | Facility: MEDICAL CENTER | Age: 33
End: 2018-11-28
Attending: OTOLARYNGOLOGY
Payer: COMMERCIAL

## 2018-11-28 RX ORDER — VITAMIN E 268 MG
400 CAPSULE ORAL DAILY
Status: ON HOLD | COMMUNITY
End: 2018-12-05

## 2018-12-05 ENCOUNTER — HOSPITAL ENCOUNTER (OUTPATIENT)
Facility: MEDICAL CENTER | Age: 33
End: 2018-12-05
Attending: OTOLARYNGOLOGY | Admitting: OTOLARYNGOLOGY
Payer: COMMERCIAL

## 2018-12-05 VITALS
RESPIRATION RATE: 16 BRPM | HEIGHT: 63 IN | DIASTOLIC BLOOD PRESSURE: 90 MMHG | HEART RATE: 76 BPM | SYSTOLIC BLOOD PRESSURE: 150 MMHG | OXYGEN SATURATION: 96 % | WEIGHT: 138.67 LBS | BODY MASS INDEX: 24.57 KG/M2 | TEMPERATURE: 97.6 F

## 2018-12-05 DIAGNOSIS — J35.01 CHRONIC TONSILLITIS: ICD-10-CM

## 2018-12-05 LAB
B-HCG FREE SERPL-ACNC: <5 MIU/ML
IHCGL IHCGL: NEGATIVE MIU/ML
PATHOLOGY CONSULT NOTE: NORMAL

## 2018-12-05 PROCEDURE — 700102 HCHG RX REV CODE 250 W/ 637 OVERRIDE(OP): Performed by: ANESTHESIOLOGY

## 2018-12-05 PROCEDURE — 501838 HCHG SUTURE GENERAL: Performed by: OTOLARYNGOLOGY

## 2018-12-05 PROCEDURE — 160048 HCHG OR STATISTICAL LEVEL 1-5: Performed by: OTOLARYNGOLOGY

## 2018-12-05 PROCEDURE — 700111 HCHG RX REV CODE 636 W/ 250 OVERRIDE (IP): Performed by: ANESTHESIOLOGY

## 2018-12-05 PROCEDURE — 160036 HCHG PACU - EA ADDL 30 MINS PHASE I: Performed by: OTOLARYNGOLOGY

## 2018-12-05 PROCEDURE — 160046 HCHG PACU - 1ST 60 MINS PHASE II: Performed by: OTOLARYNGOLOGY

## 2018-12-05 PROCEDURE — 160027 HCHG SURGERY MINUTES - 1ST 30 MINS LEVEL 2: Performed by: OTOLARYNGOLOGY

## 2018-12-05 PROCEDURE — A9270 NON-COVERED ITEM OR SERVICE: HCPCS | Performed by: ANESTHESIOLOGY

## 2018-12-05 PROCEDURE — 700111 HCHG RX REV CODE 636 W/ 250 OVERRIDE (IP)

## 2018-12-05 PROCEDURE — 84702 CHORIONIC GONADOTROPIN TEST: CPT

## 2018-12-05 PROCEDURE — 160038 HCHG SURGERY MINUTES - EA ADDL 1 MIN LEVEL 2: Performed by: OTOLARYNGOLOGY

## 2018-12-05 PROCEDURE — 160025 RECOVERY II MINUTES (STATS): Performed by: OTOLARYNGOLOGY

## 2018-12-05 PROCEDURE — 501424 HCHG SPONGE, TONSIL: Performed by: OTOLARYNGOLOGY

## 2018-12-05 PROCEDURE — 160002 HCHG RECOVERY MINUTES (STAT): Performed by: OTOLARYNGOLOGY

## 2018-12-05 PROCEDURE — 502692 HCHG DRESSING, NASOPORE 8CM: Performed by: OTOLARYNGOLOGY

## 2018-12-05 PROCEDURE — 88304 TISSUE EXAM BY PATHOLOGIST: CPT

## 2018-12-05 PROCEDURE — 700102 HCHG RX REV CODE 250 W/ 637 OVERRIDE(OP)

## 2018-12-05 PROCEDURE — 160047 HCHG PACU  - EA ADDL 30 MINS PHASE II: Performed by: OTOLARYNGOLOGY

## 2018-12-05 PROCEDURE — 160009 HCHG ANES TIME/MIN: Performed by: OTOLARYNGOLOGY

## 2018-12-05 PROCEDURE — 160035 HCHG PACU - 1ST 60 MINS PHASE I: Performed by: OTOLARYNGOLOGY

## 2018-12-05 PROCEDURE — 502573 HCHG PACK, ENT: Performed by: OTOLARYNGOLOGY

## 2018-12-05 PROCEDURE — 500257: Performed by: OTOLARYNGOLOGY

## 2018-12-05 PROCEDURE — 700101 HCHG RX REV CODE 250

## 2018-12-05 RX ORDER — HYDROCODONE BITARTRATE AND ACETAMINOPHEN 5; 325 MG/1; MG/1
1-2 TABLET ORAL EVERY 4 HOURS PRN
Qty: 62 TAB | Refills: 0 | Status: SHIPPED | OUTPATIENT
Start: 2018-12-05 | End: 2018-12-12

## 2018-12-05 RX ORDER — OXYCODONE HYDROCHLORIDE 5 MG/1
10 TABLET ORAL
Status: DISCONTINUED | OUTPATIENT
Start: 2018-12-05 | End: 2018-12-05 | Stop reason: HOSPADM

## 2018-12-05 RX ORDER — HALOPERIDOL 5 MG/ML
1 INJECTION INTRAMUSCULAR
Status: DISCONTINUED | OUTPATIENT
Start: 2018-12-05 | End: 2018-12-05 | Stop reason: HOSPADM

## 2018-12-05 RX ORDER — SODIUM CHLORIDE, SODIUM LACTATE, POTASSIUM CHLORIDE, CALCIUM CHLORIDE 600; 310; 30; 20 MG/100ML; MG/100ML; MG/100ML; MG/100ML
INJECTION, SOLUTION INTRAVENOUS CONTINUOUS
Status: DISCONTINUED | OUTPATIENT
Start: 2018-12-05 | End: 2018-12-05 | Stop reason: HOSPADM

## 2018-12-05 RX ORDER — HYDROMORPHONE HYDROCHLORIDE 1 MG/ML
0.1 INJECTION, SOLUTION INTRAMUSCULAR; INTRAVENOUS; SUBCUTANEOUS
Status: DISCONTINUED | OUTPATIENT
Start: 2018-12-05 | End: 2018-12-05 | Stop reason: HOSPADM

## 2018-12-05 RX ORDER — ONDANSETRON 2 MG/ML
4 INJECTION INTRAMUSCULAR; INTRAVENOUS
Status: DISCONTINUED | OUTPATIENT
Start: 2018-12-05 | End: 2018-12-05 | Stop reason: HOSPADM

## 2018-12-05 RX ORDER — OXYCODONE HCL 5 MG/5 ML
10 SOLUTION, ORAL ORAL
Status: COMPLETED | OUTPATIENT
Start: 2018-12-05 | End: 2018-12-05

## 2018-12-05 RX ORDER — OXYMETAZOLINE HYDROCHLORIDE 0.05 G/100ML
SPRAY NASAL
Status: DISCONTINUED | OUTPATIENT
Start: 2018-12-05 | End: 2018-12-05 | Stop reason: HOSPADM

## 2018-12-05 RX ORDER — SODIUM CHLORIDE, SODIUM LACTATE, POTASSIUM CHLORIDE, CALCIUM CHLORIDE 600; 310; 30; 20 MG/100ML; MG/100ML; MG/100ML; MG/100ML
INJECTION, SOLUTION INTRAVENOUS ONCE
Status: COMPLETED | OUTPATIENT
Start: 2018-12-05 | End: 2018-12-05

## 2018-12-05 RX ORDER — AMOXICILLIN 250 MG/5ML
500 POWDER, FOR SUSPENSION ORAL 3 TIMES DAILY
Qty: 210 ML | Refills: 0 | Status: SHIPPED | OUTPATIENT
Start: 2018-12-05 | End: 2018-12-12

## 2018-12-05 RX ORDER — ONDANSETRON 4 MG/1
4 TABLET, ORALLY DISINTEGRATING ORAL EVERY 6 HOURS PRN
Qty: 10 TAB | Refills: 1 | Status: SHIPPED | OUTPATIENT
Start: 2018-12-05 | End: 2020-01-24

## 2018-12-05 RX ORDER — HYDROMORPHONE HYDROCHLORIDE 1 MG/ML
0.4 INJECTION, SOLUTION INTRAMUSCULAR; INTRAVENOUS; SUBCUTANEOUS
Status: DISCONTINUED | OUTPATIENT
Start: 2018-12-05 | End: 2018-12-05 | Stop reason: HOSPADM

## 2018-12-05 RX ORDER — OXYMETAZOLINE HYDROCHLORIDE 0.05 G/100ML
SPRAY NASAL
Status: DISCONTINUED
Start: 2018-12-05 | End: 2018-12-05 | Stop reason: HOSPADM

## 2018-12-05 RX ORDER — OXYCODONE HCL 5 MG/5 ML
5 SOLUTION, ORAL ORAL
Status: COMPLETED | OUTPATIENT
Start: 2018-12-05 | End: 2018-12-05

## 2018-12-05 RX ORDER — DIPHENHYDRAMINE HYDROCHLORIDE 50 MG/ML
12.5 INJECTION INTRAMUSCULAR; INTRAVENOUS
Status: DISCONTINUED | OUTPATIENT
Start: 2018-12-05 | End: 2018-12-05 | Stop reason: HOSPADM

## 2018-12-05 RX ORDER — OXYCODONE HYDROCHLORIDE 5 MG/1
5 TABLET ORAL
Status: DISCONTINUED | OUTPATIENT
Start: 2018-12-05 | End: 2018-12-05 | Stop reason: HOSPADM

## 2018-12-05 RX ORDER — HYDROMORPHONE HYDROCHLORIDE 1 MG/ML
0.2 INJECTION, SOLUTION INTRAMUSCULAR; INTRAVENOUS; SUBCUTANEOUS
Status: DISCONTINUED | OUTPATIENT
Start: 2018-12-05 | End: 2018-12-05 | Stop reason: HOSPADM

## 2018-12-05 RX ADMIN — OXYCODONE HYDROCHLORIDE 10 MG: 5 SOLUTION ORAL at 11:44

## 2018-12-05 RX ADMIN — SODIUM CHLORIDE, SODIUM LACTATE, POTASSIUM CHLORIDE, CALCIUM CHLORIDE: 600; 310; 30; 20 INJECTION, SOLUTION INTRAVENOUS at 09:45

## 2018-12-05 RX ADMIN — FENTANYL CITRATE 50 MCG: 50 INJECTION, SOLUTION INTRAMUSCULAR; INTRAVENOUS at 12:03

## 2018-12-05 ASSESSMENT — PAIN SCALES - GENERAL
PAINLEVEL_OUTOF10: 8
PAINLEVEL_OUTOF10: 0
PAINLEVEL_OUTOF10: 2
PAINLEVEL_OUTOF10: 2
PAINLEVEL_OUTOF10: 0
PAINLEVEL_OUTOF10: 0
PAINLEVEL_OUTOF10: 8
PAINLEVEL_OUTOF10: 5
PAINLEVEL_OUTOF10: 0
PAINLEVEL_OUTOF10: 5
PAINLEVEL_OUTOF10: 0
PAINLEVEL_OUTOF10: 0

## 2018-12-05 NOTE — DISCHARGE INSTRUCTIONS
ACTIVITY: Rest and take it easy for the first 24 hours.  A responsible adult is recommended to remain with you during that time.  It is normal to feel sleepy.  We encourage you to not do anything that requires balance, judgment or coordination.    MILD FLU-LIKE SYMPTOMS ARE NORMAL. YOU MAY EXPERIENCE GENERALIZED MUSCLE ACHES, THROAT IRRITATION, HEADACHE AND/OR SOME NAUSEA.    FOR 24 HOURS DO NOT:  Drive, operate machinery or run household appliances.  Drink beer or alcoholic beverages.   Make important decisions or sign legal documents.    SPECIAL INSTRUCTIONS: Please no blood thiners: Aspirin, Aleve, Motrin or Vitamin E. No tramadol while on pain medication prescribed by Dr. Moreira    DIET: To avoid nausea, slowly advance diet as tolerated, avoiding spicy or greasy foods for the first day.  Add more substantial food to your diet according to your physician's instructions.  Babies can be fed formula or breast milk as soon as they are hungry.  INCREASE FLUIDS AND FIBER TO AVOID CONSTIPATION.    SURGICAL DRESSING/BATHING: may take a luke warm shower in 24 hours    FOLLOW-UP APPOINTMENT:  A follow-up appointment should be arranged with your doctor in in two weeks or as scheduled.    You should CALL YOUR PHYSICIAN if you develop:  Fever greater than 101 degrees F.  Pain not relieved by medication, or persistent nausea or vomiting.  Excessive bleeding (blood soaking through dressing) or unexpected drainage from the wound.  Extreme redness or swelling around the incision site, drainage of pus or foul smelling drainage.  Inability to urinate or empty your bladder within 8 hours.  Problems with breathing or chest pain.    You should call 911 if you develop problems with breathing or chest pain.  If you are unable to contact your doctor or surgical center, you should go to the nearest emergency room or urgent care center.  Physician's telephone #: Dr. Moreira [phone]    If any questions arise, call your doctor.  If your  doctor is not available, please feel free to call the Surgical Center at [phone].  The Center is open Monday through Friday from 7AM to 7PM.  You can also call the HEALTH HOTLINE open 24 hours/day, 7 days/week and speak to a nurse at [phone], or toll free at [phone].    A registered nurse may call you a few days after your surgery to see how you are doing after your procedure.    MEDICATIONS: Resume taking daily medication.  Take prescribed pain medication with food.  If no medication is prescribed, you may take non-aspirin pain medication if needed.  PAIN MEDICATION CAN BE VERY CONSTIPATING.  Take a stool softener or laxative such as senokot, pericolace, or milk of magnesia if needed.    Prescription given for pain medication, nausea medication, zofran.  Last pain medication given at 11:45AM.    If your physician has prescribed pain medication that includes Acetaminophen (Tylenol), do not take additional Acetaminophen (Tylenol) while taking the prescribed medication.    Depression / Suicide Risk    As you are discharged from this [facility] facility, it is important to learn how to keep safe from harming yourself.    Recognize the warning signs:  · Abrupt changes in personality, positive or negative- including increase in energy   · Giving away possessions  · Change in eating patterns- significant weight changes-  positive or negative  · Change in sleeping patterns- unable to sleep or sleeping all the time   · Unwillingness or inability to communicate  · Depression  · Unusual sadness, discouragement and loneliness  · Talk of wanting to die  · Neglect of personal appearance   · Rebelliousness- reckless behavior  · Withdrawal from people/activities they love  · Confusion- inability to concentrate     If you or a loved one observes any of these behaviors or has concerns about self-harm, here's what you can do:  · Talk about it- your feelings and reasons for harming yourself  · Remove any means  that you might use to hurt yourself (examples: pills, rope, extension cords, firearm)  · Get professional help from the community (Mental Health, Substance Abuse, psychological counseling)  · Do not be alone:Call your Safe Contact- someone whom you trust who will be there for you.  · Call your local CRISIS HOTLINE 052-0593 or 301-505-4596  · Call your local Children's Mobile Crisis Response Team [place] (853) 607-2336 or www.[employer]  · Call the toll free National Suicide Prevention Hotlines   · National Suicide Prevention Lifeline 509-209-IANW (2537)  · National Hope Line Network 800-SUICIDE (210-6346)

## 2018-12-05 NOTE — OR NURSING
1200  Report taken from JUAN Colindres    1203  50mcg fentanyl given IV for pain 8/10.  Pt eating popsicle, vs stable.    1210 Humidified face tent replaced for SPO2 on RA 78%.  With face tent back to 100%.  PT states pain now tolerable then falls back asleep, all other vs stable.    1235 Report given to JUAN Colindres    1310 Pt taken off oxygen.  Resting comfortably, vs stable.    1340 Instructions read to pt and .  Pt states she is comfortable at this time but they are waiting for their prescription.    1420 Pt OOB to BR    1430 Pt back in bed feeling dizzy still.  Pt given ice cream and juice.      1451 Pt meets discharge criteria.   has prescriptions filled.  PT discharged to home in wheelchair

## 2018-12-05 NOTE — OR NURSING
1102 Pt received from OR, received report from Dr. Green. Pt moving around, c/o throat pain, medicated by anesthesia. Some blood in mouth r/t bleeding during surgery, no active bleeding observed.     1105 Pt on face tent humidified O2.     1142 Pt medicated with fent for pain 5/10 to throat.    1144 Pt medicated with oxy for pain 6/10 to throat.      1200 Report to Rosita MARTINEZ.

## 2018-12-05 NOTE — OP REPORT
DATE OF OPERATION: 12/5/2018     PREOPERATIVE DIAGNOSIS: Chronic tonsilitis    POSTOPERATIVE DIAGNOSIS: Same    PROCEDURE: Tonsillectomy     ATTENDING: Kami Moreira MD     ANESTHESIA:  Anesthesiologist: Jeison Green M.D.     COMPLICATIONS: None.     SPECIMENS: Tonsils.     Blood loss: 350cc    PROCEDURE IN DETAIL: The patient was properly identified and taken to the   operating room where they were laid in supine position. General anesthesia was   induced and endotracheal tube and IV was placed.  The   patient was placed in supine position and turned at 90 degrees with a shoulder   roll under shoulder and head drape on the head. A McIvor mouth gag was used   to open and suspend the patient from Fuentes stand. The patient was noted to have +2 cyptic tonsils   tonsils.  Attention was then turned to the right tonsil, which was grasped, retracted medially, the anterior pillar was incised using Gold laser at 16 mayes and taken down the tonsillar capsule, removed out of the tonsillar fossa without difficulty. Attention was then turned to the left tonsil, it was grasped and   retracted medially. The anerior pillar was incised using Gold laser at 16   mayes and taken along with tonsillar capsule, removed out of the tonsillar   Fossa.  Brisk bleeding was seen from the left side.   Several fight 8 3.0 chromic were placed in the fossa with continued bleeding.  These were removed and a small vessel was identified.  This was clamped.  3 3.0 chromic sutures were placed in the fossa around the vessel.  A small piece of nasopore was also placed in the tonsillar fossa and sutured into place. After this was completed, the reinspection showed   no active bleeding.  The   nose and mouth were irrigated and the patient was unprepped and draped,   returned to anesthesia, awakened, extubated, returned to recovery room in   stable satisfactory condition.

## 2020-01-24 RX ORDER — CHOLECALCIFEROL (VITAMIN D3) 25 MCG
1000 CAPSULE ORAL EVERY MORNING
COMMUNITY

## 2020-01-28 ENCOUNTER — ANESTHESIA EVENT (OUTPATIENT)
Dept: SURGERY | Facility: MEDICAL CENTER | Age: 35
End: 2020-01-28
Payer: COMMERCIAL

## 2020-01-29 ENCOUNTER — HOSPITAL ENCOUNTER (OUTPATIENT)
Facility: MEDICAL CENTER | Age: 35
End: 2020-01-29
Attending: OTOLARYNGOLOGY | Admitting: OTOLARYNGOLOGY
Payer: COMMERCIAL

## 2020-01-29 ENCOUNTER — ANESTHESIA (OUTPATIENT)
Dept: SURGERY | Facility: MEDICAL CENTER | Age: 35
End: 2020-01-29
Payer: COMMERCIAL

## 2020-01-29 VITALS
WEIGHT: 129.41 LBS | DIASTOLIC BLOOD PRESSURE: 65 MMHG | HEART RATE: 62 BPM | BODY MASS INDEX: 22.93 KG/M2 | RESPIRATION RATE: 16 BRPM | HEIGHT: 63 IN | OXYGEN SATURATION: 97 % | TEMPERATURE: 97.6 F | SYSTOLIC BLOOD PRESSURE: 110 MMHG

## 2020-01-29 LAB — PATHOLOGY CONSULT NOTE: NORMAL

## 2020-01-29 PROCEDURE — 700101 HCHG RX REV CODE 250: Performed by: ANESTHESIOLOGY

## 2020-01-29 PROCEDURE — 88342 IMHCHEM/IMCYTCHM 1ST ANTB: CPT

## 2020-01-29 PROCEDURE — 700102 HCHG RX REV CODE 250 W/ 637 OVERRIDE(OP): Performed by: ANESTHESIOLOGY

## 2020-01-29 PROCEDURE — 160002 HCHG RECOVERY MINUTES (STAT): Performed by: OTOLARYNGOLOGY

## 2020-01-29 PROCEDURE — 501838 HCHG SUTURE GENERAL: Performed by: OTOLARYNGOLOGY

## 2020-01-29 PROCEDURE — 160035 HCHG PACU - 1ST 60 MINS PHASE I: Performed by: OTOLARYNGOLOGY

## 2020-01-29 PROCEDURE — 160025 RECOVERY II MINUTES (STATS): Performed by: OTOLARYNGOLOGY

## 2020-01-29 PROCEDURE — 700105 HCHG RX REV CODE 258: Performed by: OTOLARYNGOLOGY

## 2020-01-29 PROCEDURE — 160048 HCHG OR STATISTICAL LEVEL 1-5: Performed by: OTOLARYNGOLOGY

## 2020-01-29 PROCEDURE — 700111 HCHG RX REV CODE 636 W/ 250 OVERRIDE (IP): Performed by: ANESTHESIOLOGY

## 2020-01-29 PROCEDURE — 500331 HCHG COTTONOID, SURG PATTIE: Performed by: OTOLARYNGOLOGY

## 2020-01-29 PROCEDURE — A9270 NON-COVERED ITEM OR SERVICE: HCPCS | Performed by: ANESTHESIOLOGY

## 2020-01-29 PROCEDURE — 160046 HCHG PACU - 1ST 60 MINS PHASE II: Performed by: OTOLARYNGOLOGY

## 2020-01-29 PROCEDURE — 160029 HCHG SURGERY MINUTES - 1ST 30 MINS LEVEL 4: Performed by: OTOLARYNGOLOGY

## 2020-01-29 PROCEDURE — 88341 IMHCHEM/IMCYTCHM EA ADD ANTB: CPT

## 2020-01-29 PROCEDURE — 84702 CHORIONIC GONADOTROPIN TEST: CPT

## 2020-01-29 PROCEDURE — 88305 TISSUE EXAM BY PATHOLOGIST: CPT

## 2020-01-29 PROCEDURE — 160009 HCHG ANES TIME/MIN: Performed by: OTOLARYNGOLOGY

## 2020-01-29 PROCEDURE — 502573 HCHG PACK, ENT: Performed by: OTOLARYNGOLOGY

## 2020-01-29 RX ORDER — MEPERIDINE HYDROCHLORIDE 25 MG/ML
6.25 INJECTION INTRAMUSCULAR; INTRAVENOUS; SUBCUTANEOUS
Status: DISCONTINUED | OUTPATIENT
Start: 2020-01-29 | End: 2020-01-29 | Stop reason: HOSPADM

## 2020-01-29 RX ORDER — SODIUM CHLORIDE, SODIUM LACTATE, POTASSIUM CHLORIDE, CALCIUM CHLORIDE 600; 310; 30; 20 MG/100ML; MG/100ML; MG/100ML; MG/100ML
INJECTION, SOLUTION INTRAVENOUS CONTINUOUS
Status: DISCONTINUED | OUTPATIENT
Start: 2020-01-29 | End: 2020-01-29 | Stop reason: HOSPADM

## 2020-01-29 RX ORDER — ONDANSETRON 2 MG/ML
INJECTION INTRAMUSCULAR; INTRAVENOUS PRN
Status: DISCONTINUED | OUTPATIENT
Start: 2020-01-29 | End: 2020-01-29 | Stop reason: SURG

## 2020-01-29 RX ORDER — DIPHENHYDRAMINE HYDROCHLORIDE 50 MG/ML
12.5 INJECTION INTRAMUSCULAR; INTRAVENOUS
Status: DISCONTINUED | OUTPATIENT
Start: 2020-01-29 | End: 2020-01-29 | Stop reason: HOSPADM

## 2020-01-29 RX ORDER — ROCURONIUM BROMIDE 10 MG/ML
INJECTION, SOLUTION INTRAVENOUS PRN
Status: DISCONTINUED | OUTPATIENT
Start: 2020-01-29 | End: 2020-01-29 | Stop reason: SURG

## 2020-01-29 RX ORDER — LIDOCAINE HYDROCHLORIDE AND EPINEPHRINE 10; 10 MG/ML; UG/ML
INJECTION, SOLUTION INFILTRATION; PERINEURAL
Status: DISCONTINUED
Start: 2020-01-29 | End: 2020-01-29 | Stop reason: HOSPADM

## 2020-01-29 RX ORDER — MIDAZOLAM HYDROCHLORIDE 1 MG/ML
INJECTION INTRAMUSCULAR; INTRAVENOUS PRN
Status: DISCONTINUED | OUTPATIENT
Start: 2020-01-29 | End: 2020-01-29 | Stop reason: SURG

## 2020-01-29 RX ORDER — ONDANSETRON 2 MG/ML
4 INJECTION INTRAMUSCULAR; INTRAVENOUS
Status: DISCONTINUED | OUTPATIENT
Start: 2020-01-29 | End: 2020-01-29 | Stop reason: HOSPADM

## 2020-01-29 RX ORDER — OXYMETAZOLINE HYDROCHLORIDE 0.05 G/100ML
SPRAY NASAL
Status: DISCONTINUED
Start: 2020-01-29 | End: 2020-01-29 | Stop reason: HOSPADM

## 2020-01-29 RX ORDER — DEXAMETHASONE SODIUM PHOSPHATE 4 MG/ML
INJECTION, SOLUTION INTRA-ARTICULAR; INTRALESIONAL; INTRAMUSCULAR; INTRAVENOUS; SOFT TISSUE PRN
Status: DISCONTINUED | OUTPATIENT
Start: 2020-01-29 | End: 2020-01-29 | Stop reason: SURG

## 2020-01-29 RX ORDER — HALOPERIDOL 5 MG/ML
1 INJECTION INTRAMUSCULAR
Status: DISCONTINUED | OUTPATIENT
Start: 2020-01-29 | End: 2020-01-29 | Stop reason: HOSPADM

## 2020-01-29 RX ADMIN — PROPOFOL 200 MG: 10 INJECTION, EMULSION INTRAVENOUS at 12:16

## 2020-01-29 RX ADMIN — SUGAMMADEX 200 MG: 100 INJECTION, SOLUTION INTRAVENOUS at 12:34

## 2020-01-29 RX ADMIN — DEXAMETHASONE SODIUM PHOSPHATE 8 MG: 4 INJECTION, SOLUTION INTRA-ARTICULAR; INTRALESIONAL; INTRAMUSCULAR; INTRAVENOUS; SOFT TISSUE at 12:23

## 2020-01-29 RX ADMIN — FENTANYL CITRATE 50 MCG: 50 INJECTION, SOLUTION INTRAMUSCULAR; INTRAVENOUS at 12:15

## 2020-01-29 RX ADMIN — ROCURONIUM BROMIDE 50 MG: 10 INJECTION, SOLUTION INTRAVENOUS at 12:16

## 2020-01-29 RX ADMIN — HYDROCODONE BITARTRATE AND ACETAMINOPHEN 15 ML: 7.5; 325 SOLUTION ORAL at 13:37

## 2020-01-29 RX ADMIN — SODIUM CHLORIDE, POTASSIUM CHLORIDE, SODIUM LACTATE AND CALCIUM CHLORIDE: 600; 310; 30; 20 INJECTION, SOLUTION INTRAVENOUS at 11:11

## 2020-01-29 RX ADMIN — SODIUM CHLORIDE, POTASSIUM CHLORIDE, SODIUM LACTATE AND CALCIUM CHLORIDE: 600; 310; 30; 20 INJECTION, SOLUTION INTRAVENOUS at 13:14

## 2020-01-29 RX ADMIN — MIDAZOLAM HYDROCHLORIDE 2 MG: 1 INJECTION, SOLUTION INTRAMUSCULAR; INTRAVENOUS at 12:12

## 2020-01-29 RX ADMIN — ONDANSETRON 4 MG: 2 INJECTION INTRAMUSCULAR; INTRAVENOUS at 12:23

## 2020-01-29 NOTE — DISCHARGE INSTRUCTIONS
ACTIVITY: Rest and take it easy for the first 24 hours.  A responsible adult is recommended to remain with you during that time.  It is normal to feel sleepy.  We encourage you to not do anything that requires balance, judgment or coordination.    MILD FLU-LIKE SYMPTOMS ARE NORMAL. YOU MAY EXPERIENCE GENERALIZED MUSCLE ACHES, THROAT IRRITATION, HEADACHE AND/OR SOME NAUSEA.    FOR 24 HOURS DO NOT:  Drive, operate machinery or run household appliances.  Drink beer or alcoholic beverages.   Make important decisions or sign legal documents.    SPECIAL INSTRUCTION  Soft foods    DIET: To avoid nausea, slowly advance diet as tolerated, avoiding spicy or greasy foods for the first day.  Add more substantial food to your diet according to your physician's instructions.  Babies can be fed formula or breast milk as soon as they are hungry.  INCREASE FLUIDS AND FIBER TO AVOID CONSTIPATION.    SURGICAL DRESSING/BATHING:NA    FOLLOW-UP APPOINTMENT:  A follow-up appointment should be arranged with your doctor  call to schedule.    You should CALL YOUR PHYSICIAN if you develop:  Fever greater than 101 degrees F.  Pain not relieved by medication, or persistent nausea or vomiting.  Excessive bleeding (blood soaking through dressing) or unexpected drainage from the wound.  Extreme redness or swelling around the incision site, drainage of pus or foul smelling drainage.  Inability to urinate or empty your bladder within 8 hours.  Problems with breathing or chest pain.    You should call 911 if you develop problems with breathing or chest pain.  If you are unable to contact your doctor or surgical center, you should go to the nearest emergency room or urgent care center.  Physician's telephone  Dr Moreira [phone]    If any questions arise, call your doctor.  If your doctor is not available, please feel free to call the Surgical Center at [phone].  The Center is open Monday through Friday from 7AM to 7PM.  You can also call  the HEALTH HOTLINE open 24 hours/day, 7 days/week and speak to a nurse at [phone], or toll free at [phone].    A registered nurse may call you a few days after your surgery to see how you are doing after your procedure.    MEDICATIONS: Resume taking daily medication.  Take prescribed pain medication with food.  If no medication is prescribed, you may take non-aspirin pain medication if needed.  PAIN MEDICATION CAN BE VERY CONSTIPATING.  Take a stool softener or laxative such as senokot, pericolace, or milk of magnesia if needed.    .  Last pain medication given at 1345.    If your physician has prescribed pain medication that includes Acetaminophen (Tylenol), do not take additional Acetaminophen (Tylenol) while taking the prescribed medication.    Depression / Suicide Risk    As you are discharged from this [facility] facility, it is important to learn how to keep safe from harming yourself.    Recognize the warning signs:  · Abrupt changes in personality, positive or negative- including increase in energy   · Giving away possessions  · Change in eating patterns- significant weight changes-  positive or negative  · Change in sleeping patterns- unable to sleep or sleeping all the time   · Unwillingness or inability to communicate  · Depression  · Unusual sadness, discouragement and loneliness  · Talk of wanting to die  · Neglect of personal appearance   · Rebelliousness- reckless behavior  · Withdrawal from people/activities they love  · Confusion- inability to concentrate     If you or a loved one observes any of these behaviors or has concerns about self-harm, here's what you can do:  · Talk about it- your feelings and reasons for harming yourself  · Remove any means that you might use to hurt yourself (examples: pills, rope, extension cords, firearm)  · Get professional help from the community (Mental Health, Substance Abuse, psychological counseling)  · Do not be alone:Call your Safe Contact-  someone whom you trust who will be there for you.  · Call your local CRISIS HOTLINE 311-7641 or 893-729-0622  · Call your local Children's Mobile Crisis Response Team [place] (582) 473-9870 or www.[employer]  · Call the toll free National Suicide Prevention Hotlines   · National Suicide Prevention Lifeline 991-973-BSQJ (6182)  · [place] Line Network 800-SUICIDE (759-5572)

## 2020-01-29 NOTE — ANESTHESIA TIME REPORT
Anesthesia Start and Stop Event Times     Date Time Event    1/29/2020 1210 Ready for Procedure     1213 Anesthesia Start     1245 Anesthesia Stop        Responsible Staff  01/29/20    Name Role Begin End    Shu Child M.D. Anesth 1213 1245        Preop Diagnosis (Free Text):  Pre-op Diagnosis     LINGUAL HYPER TONSIL TROPHY        Preop Diagnosis (Codes):    Post op Diagnosis  Lingual tonsil hypertrophy      Premium Reason  Non-Premium    Comments:

## 2020-01-29 NOTE — ANESTHESIA PROCEDURE NOTES
Airway  Date/Time: 1/29/2020 12:18 PM  Performed by: Shu Child M.D.  Authorized by: Shu Child M.D.     Location:  OR  Urgency:  Elective  Difficult Airway: No    Indications for Airway Management:  Anesthesia  Spontaneous Ventilation: absent    Sedation Level:  Deep  Preoxygenated: Yes    Patient Position:  Sniffing  Mask Difficulty Assessment:  1 - vent by mask  Final Airway Type:  Endotracheal airway  Final Endotracheal Airway:  ETT  Cuffed: Yes    Technique Used for Successful ETT Placement:  Direct laryngoscopy  Insertion Site:  Oral  Blade Type:  Pete  Laryngoscope Blade/Videolaryngoscope Blade Size:  3  ETT Size (mm):  6.0  Measured from:  Teeth  ETT to Teeth (cm):  22  Placement Verified by: auscultation and capnometry    Cormack-Lehane Classification:  Grade I - full view of glottis  Number of Attempts at Approach:  1

## 2020-01-29 NOTE — OR NURSING
1244-Received from OR via krystin. S/P tongue biopsy.  Bedside report received from RN. And Anesthesiologist.    1320-handoff to GAYATHRI Quigley RN.    1330-phase 1 complete.    1355-resumed care of patient.    1415-meets discharge criteria. Iv removed. Instructions explained to sister  And patient.  All questions answered. Discharged home with responsible party. Escorted to lobby with sister.

## 2020-01-29 NOTE — OP REPORT
DATE OF SERVICE:  01/29/2020    PREOPERATIVE DIAGNOSIS:  Throat pain with tongue asymmetry.    POSTOPERATIVE DIAGNOSIS:  Throat pain with tongue asymmetry.    PROCEDURE:  Direct laryngoscopy with biopsy of tongue base.    ATTENDING:  Kami Moreira MD    ANESTHESIOLOGIST:  Shu Child MD    SPECIMENS:  Tongue mass.    PROCEDURE IN DETAIL:  The patient was appropriately identified and taken to   the operating room, was lying in spine position.  General anesthesia was   induced and endotracheal tube was placed.  At this time, palpation of the   throat showed enlargement and some lumpiness to the base of the tongue on the   right.  A direct laryngoscopy was carried out and there was some irregular   tissue with some exudate on the right tongue base.  The left looked fine.  The   larynx looked good as well as the vallecula and bilateral piriform looked   normal.  At this time, several biopsies were taken of the tongue base.  The   patient was allowed to relax for several minutes and then returned to   anesthesia.  She was awakened, extubated and returned to recovery room in   stable and satisfactory condition.       ____________________________________     Kami Moreira MD    CWG / NTS    DD:  01/29/2020 12:37:23  DT:  01/29/2020 13:04:00    D#:  [id number]  Job#:  [id number]

## 2020-01-29 NOTE — ANESTHESIA POSTPROCEDURE EVALUATION
Patient: Amy Charles    Procedure Summary     Date:  01/29/20 Room / Location:  [facility] ROOM 21 / SURGERY SAME DAY [facility]    Anesthesia Start:  1213 Anesthesia Stop:  1245    Procedure:  LARYNGOSCOPY- DIRECT WITH TONGUE BIOPSY (N/A Throat) Diagnosis:  (LINGUAL HYPER TONSIL TROPHY)    Surgeon:  Kami Moreira M.D. Responsible Provider:  Shu Child M.D.    Anesthesia Type:  general ASA Status:  2          Final Anesthesia Type: general  Last vitals  BP   Blood Pressure: 112/55, NIBP: 101/45    Temp   36.4 °C (97.6 °F)    Pulse   Pulse: 70   Resp   12    SpO2   100 %      Anesthesia Post Evaluation    Patient location during evaluation: PACU  Patient participation: complete - patient participated  Level of consciousness: awake and alert    Airway patency: patent  Anesthetic complications: no  Cardiovascular status: hemodynamically stable  Respiratory status: acceptable  Hydration status: euvolemic    PONV: none           Nurse Pain Score: 0 (NPRS)

## 2020-01-29 NOTE — OR NURSING
1320 pt rests quietly.  HOB up 30 degrees.  Tolerated po fluids without issue.  Pt family at bedside.    1325 Pain 1-2/10  1340 desires pain med, pain 3/10  See MAR  1350 up to BR.  Voids.  Back to PACU, up in chair.  VS stable  1355 Report to Anabela MARTINEZ

## 2020-01-30 LAB
B-HCG FREE SERPL-ACNC: <5 MIU/ML
IHCGL IHCGL: NEGATIVE MIU/ML

## 2020-06-17 ENCOUNTER — HOSPITAL ENCOUNTER (OUTPATIENT)
Facility: MEDICAL CENTER | Age: 35
End: 2020-06-18
Attending: EMERGENCY MEDICINE | Admitting: HOSPITALIST
Payer: COMMERCIAL

## 2020-06-17 ENCOUNTER — APPOINTMENT (OUTPATIENT)
Dept: CARDIOLOGY | Facility: MEDICAL CENTER | Age: 35
End: 2020-06-17
Attending: HOSPITALIST
Payer: COMMERCIAL

## 2020-06-17 DIAGNOSIS — R79.89 ELEVATED TROPONIN: ICD-10-CM

## 2020-06-17 DIAGNOSIS — R55 SYNCOPE, UNSPECIFIED SYNCOPE TYPE: ICD-10-CM

## 2020-06-17 LAB
ALBUMIN SERPL BCP-MCNC: 3.9 G/DL (ref 3.2–4.9)
ALBUMIN/GLOB SERPL: 1.5 G/DL
ALP SERPL-CCNC: 61 U/L (ref 30–99)
ALT SERPL-CCNC: 19 U/L (ref 2–50)
ANION GAP SERPL CALC-SCNC: 9 MMOL/L (ref 7–16)
AST SERPL-CCNC: 17 U/L (ref 12–45)
BASOPHILS # BLD AUTO: 0.4 % (ref 0–1.8)
BASOPHILS # BLD: 0.03 K/UL (ref 0–0.12)
BILIRUB SERPL-MCNC: 0.2 MG/DL (ref 0.1–1.5)
BUN SERPL-MCNC: 12 MG/DL (ref 8–22)
CALCIUM SERPL-MCNC: 8.8 MG/DL (ref 8.5–10.5)
CHLORIDE SERPL-SCNC: 105 MMOL/L (ref 96–112)
CO2 SERPL-SCNC: 20 MMOL/L (ref 20–33)
CREAT SERPL-MCNC: 0.67 MG/DL (ref 0.5–1.4)
EKG IMPRESSION: NORMAL
EKG IMPRESSION: NORMAL
EOSINOPHIL # BLD AUTO: 0.01 K/UL (ref 0–0.51)
EOSINOPHIL NFR BLD: 0.1 % (ref 0–6.9)
ERYTHROCYTE [DISTWIDTH] IN BLOOD BY AUTOMATED COUNT: 41.1 FL (ref 35.9–50)
GLOBULIN SER CALC-MCNC: 2.6 G/DL (ref 1.9–3.5)
GLUCOSE SERPL-MCNC: 88 MG/DL (ref 65–99)
HCG UR QL: NEGATIVE
HCT VFR BLD AUTO: 41.2 % (ref 37–47)
HGB BLD-MCNC: 13.7 G/DL (ref 12–16)
IMM GRANULOCYTES # BLD AUTO: 0.02 K/UL (ref 0–0.11)
IMM GRANULOCYTES NFR BLD AUTO: 0.3 % (ref 0–0.9)
LYMPHOCYTES # BLD AUTO: 2.08 K/UL (ref 1–4.8)
LYMPHOCYTES NFR BLD: 27.5 % (ref 22–41)
MAGNESIUM SERPL-MCNC: 2.1 MG/DL (ref 1.5–2.5)
MCH RBC QN AUTO: 30 PG (ref 27–33)
MCHC RBC AUTO-ENTMCNC: 33.3 G/DL (ref 33.6–35)
MCV RBC AUTO: 90.4 FL (ref 81.4–97.8)
MONOCYTES # BLD AUTO: 0.54 K/UL (ref 0–0.85)
MONOCYTES NFR BLD AUTO: 7.1 % (ref 0–13.4)
NEUTROPHILS # BLD AUTO: 4.89 K/UL (ref 2–7.15)
NEUTROPHILS NFR BLD: 64.6 % (ref 44–72)
NRBC # BLD AUTO: 0 K/UL
NRBC BLD-RTO: 0 /100 WBC
PLATELET # BLD AUTO: 218 K/UL (ref 164–446)
PMV BLD AUTO: 9.5 FL (ref 9–12.9)
POTASSIUM SERPL-SCNC: 3.5 MMOL/L (ref 3.6–5.5)
PROT SERPL-MCNC: 6.5 G/DL (ref 6–8.2)
RBC # BLD AUTO: 4.56 M/UL (ref 4.2–5.4)
SODIUM SERPL-SCNC: 134 MMOL/L (ref 135–145)
TROPONIN T SERPL-MCNC: 18 NG/L (ref 6–19)
TROPONIN T SERPL-MCNC: 64 NG/L (ref 6–19)
TROPONIN T SERPL-MCNC: 76 NG/L (ref 6–19)
TROPONIN T SERPL-MCNC: 77 NG/L (ref 6–19)
TSH SERPL DL<=0.005 MIU/L-ACNC: 1.46 UIU/ML (ref 0.38–5.33)
WBC # BLD AUTO: 7.6 K/UL (ref 4.8–10.8)

## 2020-06-17 PROCEDURE — 84443 ASSAY THYROID STIM HORMONE: CPT

## 2020-06-17 PROCEDURE — 93306 TTE W/DOPPLER COMPLETE: CPT

## 2020-06-17 PROCEDURE — 81025 URINE PREGNANCY TEST: CPT

## 2020-06-17 PROCEDURE — 700111 HCHG RX REV CODE 636 W/ 250 OVERRIDE (IP): Performed by: HOSPITALIST

## 2020-06-17 PROCEDURE — 80053 COMPREHEN METABOLIC PANEL: CPT

## 2020-06-17 PROCEDURE — 84484 ASSAY OF TROPONIN QUANT: CPT | Mod: 91

## 2020-06-17 PROCEDURE — 85025 COMPLETE CBC W/AUTO DIFF WBC: CPT

## 2020-06-17 PROCEDURE — A9270 NON-COVERED ITEM OR SERVICE: HCPCS | Performed by: HOSPITALIST

## 2020-06-17 PROCEDURE — 700102 HCHG RX REV CODE 250 W/ 637 OVERRIDE(OP): Performed by: HOSPITALIST

## 2020-06-17 PROCEDURE — 96372 THER/PROPH/DIAG INJ SC/IM: CPT

## 2020-06-17 PROCEDURE — G0378 HOSPITAL OBSERVATION PER HR: HCPCS

## 2020-06-17 PROCEDURE — 99220 PR INITIAL OBSERVATION CARE,LEVL III: CPT | Performed by: HOSPITALIST

## 2020-06-17 PROCEDURE — 93005 ELECTROCARDIOGRAM TRACING: CPT | Performed by: EMERGENCY MEDICINE

## 2020-06-17 PROCEDURE — 99285 EMERGENCY DEPT VISIT HI MDM: CPT

## 2020-06-17 PROCEDURE — 83735 ASSAY OF MAGNESIUM: CPT

## 2020-06-17 RX ORDER — AMOXICILLIN 250 MG
2 CAPSULE ORAL 2 TIMES DAILY
Status: DISCONTINUED | OUTPATIENT
Start: 2020-06-17 | End: 2020-06-18 | Stop reason: HOSPADM

## 2020-06-17 RX ORDER — ACETAMINOPHEN 325 MG/1
650 TABLET ORAL EVERY 6 HOURS PRN
Status: DISCONTINUED | OUTPATIENT
Start: 2020-06-17 | End: 2020-06-18 | Stop reason: HOSPADM

## 2020-06-17 RX ORDER — ONDANSETRON 2 MG/ML
4 INJECTION INTRAMUSCULAR; INTRAVENOUS EVERY 4 HOURS PRN
Status: DISCONTINUED | OUTPATIENT
Start: 2020-06-17 | End: 2020-06-18 | Stop reason: HOSPADM

## 2020-06-17 RX ORDER — POTASSIUM CHLORIDE 20 MEQ/1
20 TABLET, EXTENDED RELEASE ORAL ONCE
Status: COMPLETED | OUTPATIENT
Start: 2020-06-17 | End: 2020-06-17

## 2020-06-17 RX ORDER — BISACODYL 10 MG
10 SUPPOSITORY, RECTAL RECTAL
Status: DISCONTINUED | OUTPATIENT
Start: 2020-06-17 | End: 2020-06-18 | Stop reason: HOSPADM

## 2020-06-17 RX ORDER — PROCHLORPERAZINE EDISYLATE 5 MG/ML
5-10 INJECTION INTRAMUSCULAR; INTRAVENOUS EVERY 4 HOURS PRN
Status: DISCONTINUED | OUTPATIENT
Start: 2020-06-17 | End: 2020-06-18 | Stop reason: HOSPADM

## 2020-06-17 RX ORDER — PROMETHAZINE HYDROCHLORIDE 25 MG/1
12.5-25 SUPPOSITORY RECTAL EVERY 4 HOURS PRN
Status: DISCONTINUED | OUTPATIENT
Start: 2020-06-17 | End: 2020-06-18 | Stop reason: HOSPADM

## 2020-06-17 RX ORDER — POLYETHYLENE GLYCOL 3350 17 G/17G
1 POWDER, FOR SOLUTION ORAL
Status: DISCONTINUED | OUTPATIENT
Start: 2020-06-17 | End: 2020-06-18 | Stop reason: HOSPADM

## 2020-06-17 RX ORDER — PROMETHAZINE HYDROCHLORIDE 25 MG/1
12.5-25 TABLET ORAL EVERY 4 HOURS PRN
Status: DISCONTINUED | OUTPATIENT
Start: 2020-06-17 | End: 2020-06-18 | Stop reason: HOSPADM

## 2020-06-17 RX ORDER — ONDANSETRON 4 MG/1
4 TABLET, ORALLY DISINTEGRATING ORAL EVERY 4 HOURS PRN
Status: DISCONTINUED | OUTPATIENT
Start: 2020-06-17 | End: 2020-06-18 | Stop reason: HOSPADM

## 2020-06-17 RX ADMIN — POTASSIUM CHLORIDE 20 MEQ: 1500 TABLET, EXTENDED RELEASE ORAL at 17:00

## 2020-06-17 RX ADMIN — ENOXAPARIN SODIUM 40 MG: 40 INJECTION SUBCUTANEOUS at 17:01

## 2020-06-17 ASSESSMENT — LIFESTYLE VARIABLES
ON A TYPICAL DAY WHEN YOU DRINK ALCOHOL HOW MANY DRINKS DO YOU HAVE: 0
EVER_SMOKED: NEVER
TOTAL SCORE: 0
TOTAL SCORE: 0
ALCOHOL_USE: NO
TOTAL SCORE: 0
HOW MANY TIMES IN THE PAST YEAR HAVE YOU HAD 5 OR MORE DRINKS IN A DAY: 0
AVERAGE NUMBER OF DAYS PER WEEK YOU HAVE A DRINK CONTAINING ALCOHOL: 0
EVER FELT BAD OR GUILTY ABOUT YOUR DRINKING: NO
HAVE PEOPLE ANNOYED YOU BY CRITICIZING YOUR DRINKING: NO
CONSUMPTION TOTAL: NEGATIVE
HAVE YOU EVER FELT YOU SHOULD CUT DOWN ON YOUR DRINKING: NO
EVER HAD A DRINK FIRST THING IN THE MORNING TO STEADY YOUR NERVES TO GET RID OF A HANGOVER: NO
DOES PATIENT WANT TO STOP DRINKING: NO

## 2020-06-17 ASSESSMENT — ENCOUNTER SYMPTOMS
CHILLS: 0
FEVER: 0
SHORTNESS OF BREATH: 1
SEIZURES: 0
LOSS OF CONSCIOUSNESS: 1
NAUSEA: 0
VOMITING: 0

## 2020-06-17 ASSESSMENT — PATIENT HEALTH QUESTIONNAIRE - PHQ9
2. FEELING DOWN, DEPRESSED, IRRITABLE, OR HOPELESS: NOT AT ALL
SUM OF ALL RESPONSES TO PHQ9 QUESTIONS 1 AND 2: 0
1. LITTLE INTEREST OR PLEASURE IN DOING THINGS: NOT AT ALL
SUM OF ALL RESPONSES TO PHQ9 QUESTIONS 1 AND 2: 0
1. LITTLE INTEREST OR PLEASURE IN DOING THINGS: NOT AT ALL
2. FEELING DOWN, DEPRESSED, IRRITABLE, OR HOPELESS: NOT AT ALL

## 2020-06-17 ASSESSMENT — FIBROSIS 4 INDEX: FIB4 SCORE: 0.64

## 2020-06-17 NOTE — ED NOTES
.Report given to flor rn, plan of care made clear, no further concerns at present time pt stable for transport.

## 2020-06-17 NOTE — ED PROVIDER NOTES
ED Provider Note    ER PROVIDER NOTE        CHIEF COMPLAINT  Chief Complaint   Patient presents with   • Syncope       HPI  Amy Charles is a 34 y.o. female who presents to the emergency department complaining of syncope.  Patient was at work earlier this morning, sitting at her desk when she began to feel lightheaded and thinks she passed out.  This was transitory, per witnesses, may be a second or 2.  States she felt like her heart was racing prior to this as well.  No seizure activity.  Patient reports she feels fine now only a little shaky and tingling in her fingers and toes.  She states that she did feel like she was breathing fast and short of breath before the episode but she denies any current chest pain, palpitations, shortness of breath, headache, nausea or vomiting.  No abdominal pain, no fevers chills cough or congestion.  States she ate cookies and coffee for breakfast this morning.  She has been under increased stress recently and not been sleeping well, but has otherwise been in her normal state of health    REVIEW OF SYSTEMS  Pertinent positives include syncope. Pertinent negatives include no chest pain. See HPI for details. All other systems reviewed and are negative.    PAST MEDICAL HISTORY   has a past medical history of Anxiety, Bowel habit changes, Depression, Headache(784.0), IBD (inflammatory bowel disease), and Pain.    SURGICAL HISTORY   has a past surgical history that includes dental extraction(s); tonsillectomy and adenoidectomy (12/5/2018); and laryngoscopy (N/A, 1/29/2020).    FAMILY HISTORY  Family History   Problem Relation Age of Onset   • Hypertension Mother    • Hyperlipidemia Mother    • Heart Disease Mother    • Diabetes Maternal Aunt    • Heart Disease Maternal Aunt    • Hypertension Maternal Aunt    • Hyperlipidemia Maternal Aunt    • Hyperlipidemia Maternal Uncle    • Hypertension Maternal Uncle    • Heart Disease Maternal Uncle    • Diabetes Maternal Uncle    •  Arthritis Maternal Grandmother    • Diabetes Maternal Grandmother    • Hypertension Maternal Grandmother    • Hyperlipidemia Maternal Grandmother    • Heart Disease Maternal Grandmother    • Lung Disease Maternal Grandfather        SOCIAL HISTORY  Social History     Socioeconomic History   • Marital status:      Spouse name: Not on file   • Number of children: Not on file   • Years of education: Not on file   • Highest education level: Not on file   Occupational History   • Not on file   Social Needs   • Financial resource strain: Not on file   • Food insecurity     Worry: Not on file     Inability: Not on file   • Transportation needs     Medical: Not on file     Non-medical: Not on file   Tobacco Use   • Smoking status: Never Smoker   • Smokeless tobacco: Never Used   Substance and Sexual Activity   • Alcohol use: No     Alcohol/week: 0.5 oz     Types: 1 Cans of beer per week   • Drug use: No   • Sexual activity: Yes     Partners: Male     Birth control/protection: I.U.D.     Comment:    Lifestyle   • Physical activity     Days per week: Not on file     Minutes per session: Not on file   • Stress: Not on file   Relationships   • Social connections     Talks on phone: Not on file     Gets together: Not on file     Attends Voodoo service: Not on file     Active member of club or organization: Not on file     Attends meetings of clubs or organizations: Not on file     Relationship status: Not on file   • Intimate partner violence     Fear of current or ex partner: Not on file     Emotionally abused: Not on file     Physically abused: Not on file     Forced sexual activity: Not on file   Other Topics Concern   • Not on file   Social History Narrative   • Not on file      Social History     Substance and Sexual Activity   Drug Use No       CURRENT MEDICATIONS  Home Medications     Reviewed by Lyudmila Moses (Pharmacy Tech) on 06/17/20 at 1000  Med List Status: Complete   Medication Last Dose  "Status   Cholecalciferol (VITAMIN D-3) 25 MCG (1000 UT) Cap > 3 DAYS Active                ALLERGIES  No Known Allergies    PHYSICAL EXAM  VITAL SIGNS: /55   Pulse 65   Temp 36.7 °C (98.1 °F) (Oral)   Resp 12   Ht 1.6 m (5' 3\")   Wt 59 kg (130 lb)   SpO2 100%   BMI 23.03 kg/m²   Pulse ox interpretation: I interpret this pulse ox as normal.    Constitutional: Alert in no apparent distress.  HENT: No signs of trauma, Bilateral external ears normal, Nose normal.   Eyes: Pupils are equal and reactive, Conjunctiva normal, Non-icteric.   Neck: Normal range of motion, No tenderness, Supple, No stridor.   Lymphatic: No lymphadenopathy noted.   Cardiovascular: Regular rate and rhythm, no murmurs.   Thorax & Lungs: Normal breath sounds, No respiratory distress, No wheezing, No chest tenderness.   Abdomen: Bowel sounds normal, Soft, No tenderness, No masses, No pulsatile masses. No peritoneal signs.  Skin: Warm, Dry, No erythema, No rash.   Back: No bony tenderness, No CVA tenderness.   Extremities: Intact distal pulses, No edema, No tenderness, No cyanosis, Negative Omar's sign.  Musculoskeletal: Good range of motion in all major joints. No tenderness to palpation or major deformities noted.   Neurologic: Alert, cranial nerves intact, speech is appropriate or not slurred, upper extremities bilaterally exhibit no drift, no dysmetria, 5 out of 5 strength with bilateral bicep/tricep/, sensation intact to light touch throughout upper extremities. Lower extremities strength 5 out of 5 thigh extension/flexion/abduction/adduction, knee extension/flexion, dorsiflexion plantar flexion. No clonus.  2+ patella reflexes.  sensation intact to light touch.  No focal deficits noted. Ambulates with steady gait, steady tandem gait  Psychiatric: Affect normal, Judgment normal, Mood normal.       DIAGNOSTIC STUDIES / PROCEDURES    EKG  Results for orders placed or performed during the hospital encounter of 06/17/20   CBC WITH " DIFFERENTIAL   Result Value Ref Range    WBC 7.6 4.8 - 10.8 K/uL    RBC 4.56 4.20 - 5.40 M/uL    Hemoglobin 13.7 12.0 - 16.0 g/dL    Hematocrit 41.2 37.0 - 47.0 %    MCV 90.4 81.4 - 97.8 fL    MCH 30.0 27.0 - 33.0 pg    MCHC 33.3 (L) 33.6 - 35.0 g/dL    RDW 41.1 35.9 - 50.0 fL    Platelet Count 218 164 - 446 K/uL    MPV 9.5 9.0 - 12.9 fL    Neutrophils-Polys 64.60 44.00 - 72.00 %    Lymphocytes 27.50 22.00 - 41.00 %    Monocytes 7.10 0.00 - 13.40 %    Eosinophils 0.10 0.00 - 6.90 %    Basophils 0.40 0.00 - 1.80 %    Immature Granulocytes 0.30 0.00 - 0.90 %    Nucleated RBC 0.00 /100 WBC    Neutrophils (Absolute) 4.89 2.00 - 7.15 K/uL    Lymphs (Absolute) 2.08 1.00 - 4.80 K/uL    Monos (Absolute) 0.54 0.00 - 0.85 K/uL    Eos (Absolute) 0.01 0.00 - 0.51 K/uL    Baso (Absolute) 0.03 0.00 - 0.12 K/uL    Immature Granulocytes (abs) 0.02 0.00 - 0.11 K/uL    NRBC (Absolute) 0.00 K/uL   COMP METABOLIC PANEL   Result Value Ref Range    Sodium 134 (L) 135 - 145 mmol/L    Potassium 3.5 (L) 3.6 - 5.5 mmol/L    Chloride 105 96 - 112 mmol/L    Co2 20 20 - 33 mmol/L    Anion Gap 9.0 7.0 - 16.0    Glucose 88 65 - 99 mg/dL    Bun 12 8 - 22 mg/dL    Creatinine 0.67 0.50 - 1.40 mg/dL    Calcium 8.8 8.5 - 10.5 mg/dL    AST(SGOT) 17 12 - 45 U/L    ALT(SGPT) 19 2 - 50 U/L    Alkaline Phosphatase 61 30 - 99 U/L    Total Bilirubin 0.2 0.1 - 1.5 mg/dL    Albumin 3.9 3.2 - 4.9 g/dL    Total Protein 6.5 6.0 - 8.2 g/dL    Globulin 2.6 1.9 - 3.5 g/dL    A-G Ratio 1.5 g/dL   TROPONIN   Result Value Ref Range    Troponin T 18 6 - 19 ng/L   MAGNESIUM   Result Value Ref Range    Magnesium 2.1 1.5 - 2.5 mg/dL   ESTIMATED GFR   Result Value Ref Range    GFR If African American >60 >60 mL/min/1.73 m 2    GFR If Non African American >60 >60 mL/min/1.73 m 2   TROPONIN   Result Value Ref Range    Troponin T 64 (H) 6 - 19 ng/L   POC URINE PREGNANCY   Result Value Ref Range    POC Urine Pregnancy Test Negative Negative   EKG   Result Value Ref Range     Report       [facility] Emergency Dept.    Test Date:  2020  [facility]   [facility]  MRN:        [id number]                      Room:       RD 06  Gender:     Female                       Technician: [id number]  :        1985                   Requested By:JOSE ALTAMIRANO  Order #:    [id number]                    Reading MD: JOSE ALTAMIRANO MD    Measurements  Intervals                                Axis  Rate:       76                           P:          78  NY:         156                          QRS:        75  QRSD:       86                           T:          24  QT:         400  QTc:        450    Interpretive Statements  SINUS ARRHYTHMIA, RATE  64- 89  No previous ECG available for comparison  Electronically Signed On 2020 10:10:14 PDT by JOSE ALTAMIRANO MD     EKG (Now)   Result Value Ref Range    Report       [facility] Emergency Dept.    Test Date:  2020  Pt Name:    INDERJIT GILLIAMARENA   Department: ER  MRN:        [id number]                      Room:        06  Gender:     Female                       Technician: [id number]  :        1985                   Requested By:JOSE ALTAMIRANO  Order #:    [id number]                    Reading MD: JOSE ALTAMIRANO MD    Measurements  Intervals                                Axis  Rate:       57                           P:          65  NY:         150                          QRS:        87  QRSD:       86                           T:          63  QT:         448  QTc:        437    Interpretive Statements  SINUS BRADYCARDIA  Compared to ECG 2020 07:43:33  Sinus arrhythmia no longer present  Electronically Signed On 2020 10:10:10 PDT by JOSE ALTAMIRANO MD           RADIOLOGY  No orders to display     The radiologist's interpretation of all radiological studies have been reviewed by me.    COURSE & MEDICAL DECISION MAKING  Nursing notes, VS,  PMSFHx reviewed in chart.    7:32 AM Patient seen and examined at bedside.  Ordered for CBC, CMP, troponin, ECG, pregnancy to evaluate her symptoms.     9:22 AM patient reevaluated, updated on results thus far, plan for repeat troponin, ECG, she is comfortable and asymptomatic    9:54 AM  Patient reevaluated again, her repeat troponin has risen, will plan for hospitalization    Discussed case with Dr. Salamanca for hospitalization    This patient was cared for during the COVID-19 pandemic.  History and physical exam may be limited/truncated by the inherent challenges of PPE and the need to decrease staff exposure to novel coronavirus.  Some aspects of disease management may be different to protect staff and help slow the spread of disease. I verified that, if possible, the patient was wearing a mask and I was wearing appropriate PPE every time I encountered the patient.     Current [facility] COVID19 protocol followed      Decision Making:  This is a 34 y.o. female presenting after syncopal event this morning.  Patient did have some prodromal palpitations, and on her evaluation here, did have a positive delta troponin.  This may be more due to a tachyarrhythmia and demand, as there is no obvious ischemia on her ECG and she really had no chest pain, certainly with her age demographic would be more likely.  However with her ischemia, the sudden syncope, elevated troponin, patient will be hospitalized for further care and evaluation.    Patient is hospitalized in stable condition    FINAL IMPRESSION  1. Syncope, unspecified syncope type    2. Elevated troponin          The note accurately reflects work and decisions made by me.  Winston Mitchell M.D.  6/17/2020  10:10 AM

## 2020-06-17 NOTE — H&P
"Hospital Medicine History & Physical Note    Date of Service  6/17/2020    Primary Care Physician  Pcp Pt States None    Code Status  Prior    Chief Complaint  Chief Complaint   Patient presents with   • Syncope       History of Presenting Illness  34 y.o. female who presented 6/17/2020 with syncope.   Ms. Freida Charles is a past medical history of chronically low blood pressure usually in the 90s systolic that has been her usual state of health until a few days ago she felt dizzy off and on so she has been working excessive hours with very little sleep.  She states today at work, my mind went blank\" she felt short of breath stood up and was dizzy she went to the bathroom where she noticed that her face was pale in the mirror and \"my heart beating so fast\".  She went out the bathroom and started to lose consciousness and had hold onto a coworker and then had a loss of consciousness.  She states that she was told by a bystanders and paramedics that her heart was racing.  She drank only 1 cup of coffee this morning.  She has never lost consciousness in the past.    She denies exposure to persons known to have COVID.  Denies fevers, chills, body aches, shortness of breath, cough, changes in her taste or smell.  Review of Systems  Review of Systems   Constitutional: Negative for chills and fever.   Respiratory: Positive for shortness of breath.    Cardiovascular: Negative for chest pain.   Gastrointestinal: Negative for nausea and vomiting.   Neurological: Positive for loss of consciousness. Negative for seizures.   All other systems reviewed and are negative.      Past Medical History   has a past medical history of Anxiety, Bowel habit changes, Depression, Headache(784.0), IBD (inflammatory bowel disease), and Pain.  No personal history of blood clots    Surgical History   has a past surgical history that includes dental extraction(s); tonsillectomy and adenoidectomy (12/5/2018); and laryngoscopy (N/A, " 1/29/2020).     Family History  Her parents are alive, her father has hypertension dyslipidemia while her mother has hypertension, dyslipidemia, diabetes mellitus  no family history of cardiac disease nor sudden death.  No family history of pulmonary embolism    Social History   reports that she has never smoked. She has never used smokeless tobacco. She reports that she does not drink alcohol or use drugs.    Allergies  No Known Allergies    Medications  Prior to Admission Medications   Prescriptions Last Dose Informant Patient Reported? Taking?   Cholecalciferol (VITAMIN D-3) 25 MCG (1000 UT) Cap > 3 DAYS at AM Patient Yes No   Sig: Take 1,000 Units by mouth every morning.      Facility-Administered Medications: None       Physical Exam  Temp:  [36.7 °C (98.1 °F)] 36.7 °C (98.1 °F)  Pulse:  [61-75] 61  Resp:  [12-16] 15  BP: (102-109)/(55-74) 102/56  SpO2:  [98 %-100 %] 100 %    Physical Exam  Vitals signs and nursing note reviewed.   Constitutional:       Appearance: Normal appearance. She is not ill-appearing or diaphoretic.   HENT:      Head: Normocephalic and atraumatic.      Mouth/Throat:      Mouth: Mucous membranes are dry.      Comments: No tongue trauma  Eyes:      General: No scleral icterus.     Conjunctiva/sclera: Conjunctivae normal.   Neck:      Musculoskeletal: Normal range of motion and neck supple.   Cardiovascular:      Heart sounds: No murmur.      Comments: bradycardia  Pulmonary:      Effort: Pulmonary effort is normal.      Breath sounds: Normal breath sounds.   Abdominal:      General: There is no distension.      Tenderness: There is no abdominal tenderness.   Musculoskeletal:         General: No swelling.      Right lower leg: No edema.      Left lower leg: No edema.      Comments: Negative Omar's sign   Skin:     General: Skin is warm and dry.   Neurological:      General: No focal deficit present.      Mental Status: She is alert and oriented to person, place, and time.   Psychiatric:          Mood and Affect: Mood normal.         Behavior: Behavior normal.         Thought Content: Thought content normal.      Comments: Speech is clear and concise         Laboratory:  Recent Labs     06/17/20  0730   WBC 7.6   RBC 4.56   HEMOGLOBIN 13.7   HEMATOCRIT 41.2   MCV 90.4   MCH 30.0   MCHC 33.3*   RDW 41.1   PLATELETCT 218   MPV 9.5     Recent Labs     06/17/20  0730   SODIUM 134*   POTASSIUM 3.5*   CHLORIDE 105   CO2 20   GLUCOSE 88   BUN 12   CREATININE 0.67   CALCIUM 8.8     Recent Labs     06/17/20  0730   ALTSGPT 19   ASTSGOT 17   ALKPHOSPHAT 61   TBILIRUBIN 0.2   GLUCOSE 88         No results for input(s): NTPROBNP in the last 72 hours.      Recent Labs     06/17/20  0730 06/17/20  0922   TROPONINT 18 64*       Imaging:  No orders to display     EKG #1 interpreted by me sinus rate of 76 QTC of 450 bimodal T wave in V1 and V2  EKG #2 interpreted by me sinus bradycardia rate of 57 otherwise no changes    Assessment/Plan:      * Syncope- (present on admission)  Assessment & Plan  Her HPI is suspicious for SVT  She is bradycardic in the ER  TSH ordered with reflex FT4  Continuous telemetry monitoring to eval for arrhythmias  Echocardiogram ordered  Replace potassium  Check pregnancy test  Low likelihood of PE  She may be a candidate for an outpatient cardiac monitor.    Troponin level elevated- (present on admission)  Assessment & Plan  Her initial troponin was 18 and went up to 64  follow troponins until they trend down  Low probability of cardiac ischemia

## 2020-06-17 NOTE — PROGRESS NOTES
Discussed with Dr Mitchell ERP, 33 yo female with no significant pmh presenting with syncope episode, per report she is been on a lot of stress, ekg neg, first trop 18, second 64 repeated ekg no changes, no hypoxia.   Dr Doe notified about admission.

## 2020-06-17 NOTE — ASSESSMENT & PLAN NOTE
Her HPI is suspicious for SVT  She is bradycardic in the ER  TSH ordered with reflex FT4  Continuous telemetry monitoring to eval for arrhythmias  Echocardiogram ordered  Replace potassium  Check pregnancy test  Low likelihood of PE  She may be a candidate for an outpatient cardiac monitor.

## 2020-06-17 NOTE — ED NOTES
Plan of care made clear to pt, no further concerns at present time. Pt resting comfortably on stretcher, pt bed height at lowest position. Pt call bell in reach. Pt has no unmet needs at present time.

## 2020-06-17 NOTE — ASSESSMENT & PLAN NOTE
Her initial troponin was 18 and went up to 64  follow troponins until they trend down  Low probability of cardiac ischemia

## 2020-06-17 NOTE — PROGRESS NOTES
PT ARRIVED TO THE  UNIT ORIENTED TO ROOM DISCUSSED PLAN OF CARE BED IN LOW POSITION  CALL LIGHT WITHIN REACH NURSING HISTORY AND ASSESSMENT DONE CONDITION STABLE

## 2020-06-17 NOTE — ED TRIAGE NOTES
BIB EMS for syncope x1 for approx 2 seconds. Pt assisted to floor no head trauma or injury sustained with LOC. Ems reports pt to have felt anxious, palpitations before event. BG=75 12lead=st depressions in 1/2/avf. Pt was given 250ml IV NS. Pt skin pink warm and dry, no resp distress at present time, pt is A&Ox4 GCS=15.

## 2020-06-18 ENCOUNTER — APPOINTMENT (OUTPATIENT)
Dept: CARDIOLOGY | Facility: MEDICAL CENTER | Age: 35
End: 2020-06-18
Attending: INTERNAL MEDICINE
Payer: COMMERCIAL

## 2020-06-18 VITALS
SYSTOLIC BLOOD PRESSURE: 96 MMHG | HEIGHT: 63 IN | HEART RATE: 62 BPM | BODY MASS INDEX: 23.24 KG/M2 | RESPIRATION RATE: 19 BRPM | TEMPERATURE: 97.4 F | OXYGEN SATURATION: 97 % | WEIGHT: 131.17 LBS | DIASTOLIC BLOOD PRESSURE: 55 MMHG

## 2020-06-18 PROBLEM — R00.2 PALPITATIONS: Status: ACTIVE | Noted: 2020-06-18

## 2020-06-18 LAB
LV EJECT FRACT  99904: 60
LV EJECT FRACT  99904: 65
LV EJECT FRACT MOD 2C 99903: 73.32
LV EJECT FRACT MOD 4C 99902: 69.05
LV EJECT FRACT MOD BP 99901: 71.4
TROPONIN T SERPL-MCNC: 84 NG/L (ref 6–19)
TROPONIN T SERPL-MCNC: 89 NG/L (ref 6–19)

## 2020-06-18 PROCEDURE — 96372 THER/PROPH/DIAG INJ SC/IM: CPT

## 2020-06-18 PROCEDURE — 93306 TTE W/DOPPLER COMPLETE: CPT | Mod: 26 | Performed by: INTERNAL MEDICINE

## 2020-06-18 PROCEDURE — G0378 HOSPITAL OBSERVATION PER HR: HCPCS

## 2020-06-18 PROCEDURE — 99217 PR OBSERVATION CARE DISCHARGE: CPT | Performed by: INTERNAL MEDICINE

## 2020-06-18 PROCEDURE — 93350 STRESS TTE ONLY: CPT | Mod: 26 | Performed by: INTERNAL MEDICINE

## 2020-06-18 PROCEDURE — 700111 HCHG RX REV CODE 636 W/ 250 OVERRIDE (IP): Performed by: HOSPITALIST

## 2020-06-18 PROCEDURE — 93017 CV STRESS TEST TRACING ONLY: CPT

## 2020-06-18 PROCEDURE — 99204 OFFICE O/P NEW MOD 45 MIN: CPT | Mod: 25 | Performed by: INTERNAL MEDICINE

## 2020-06-18 PROCEDURE — 84484 ASSAY OF TROPONIN QUANT: CPT

## 2020-06-18 PROCEDURE — 93018 CV STRESS TEST I&R ONLY: CPT | Performed by: INTERNAL MEDICINE

## 2020-06-18 RX ADMIN — ENOXAPARIN SODIUM 40 MG: 40 INJECTION SUBCUTANEOUS at 05:24

## 2020-06-18 ASSESSMENT — PATIENT HEALTH QUESTIONNAIRE - PHQ9
2. FEELING DOWN, DEPRESSED, IRRITABLE, OR HOPELESS: NOT AT ALL
SUM OF ALL RESPONSES TO PHQ9 QUESTIONS 1 AND 2: 0
1. LITTLE INTEREST OR PLEASURE IN DOING THINGS: NOT AT ALL

## 2020-06-18 NOTE — DISCHARGE INSTRUCTIONS
Discharge Instructions    Discharged to home by car with relative. Discharged via walking, hospital escort: Yes.  Special equipment needed: Not Applicable    Be sure to schedule a follow-up appointment with your primary care doctor or any specialists as instructed.     Discharge Plan:   Diet Plan: Discussed  Activity Level: Discussed  Confirmed Follow up Appointment: Patient to Call and Schedule Appointment  Confirmed Symptoms Management: Discussed  Medication Reconciliation Updated: Yes    I understand that a diet low in cholesterol, fat, and sodium is recommended for good health. Unless I have been given specific instructions below for another diet, I accept this instruction as my diet prescription.   Other diet: REG  Special Instructions: None    · Is patient discharged on Warfarin / Coumadin?   No     Depression / Suicide Risk    As you are discharged from this [facility] facility, it is important to learn how to keep safe from harming yourself.    Recognize the warning signs:  · Abrupt changes in personality, positive or negative- including increase in energy   · Giving away possessions  · Change in eating patterns- significant weight changes-  positive or negative  · Change in sleeping patterns- unable to sleep or sleeping all the time   · Unwillingness or inability to communicate  · Depression  · Unusual sadness, discouragement and loneliness  · Talk of wanting to die  · Neglect of personal appearance   · Rebelliousness- reckless behavior  · Withdrawal from people/activities they love  · Confusion- inability to concentrate     If you or a loved one observes any of these behaviors or has concerns about self-harm, here's what you can do:  · Talk about it- your feelings and reasons for harming yourself  · Remove any means that you might use to hurt yourself (examples: pills, rope, extension cords, firearm)  · Get professional help from the community (Mental Health, Substance Abuse, psychological  counseling)  · Do not be alone:Call your Safe Contact- someone whom you trust who will be there for you.  · Call your local CRISIS HOTLINE 677-7504 or 574-507-6843  · Call your local Children's Mobile Crisis Response Team [place] (637) 836-5783 or www.BRAIN  · Call the toll free National Suicide Prevention Hotlines   · National Suicide Prevention Lifeline 039-977-SNWG (6469)  · National Hope Line Network 800-SUICIDE (264-5932)

## 2020-06-18 NOTE — DISCHARGE PLANNING
Care Transition Team Assessment    Spoke with patient at bedside. Lives with spouse and 3 children under 18 in [place] [facility] [place]. Has no PCP at present time. Uses [facility] on [address] if RX's needed. Anticipate no needs at present time. Spouse will be ride @ D/C.    Information Source  Information Given By: Patient    Readmission Evaluation  Is this a readmission?: No    Interdisciplinary Discharge Planning  Does Admitting Nurse Feel This Could be a Complex Discharge?: No  Primary Care Physician: None at present time  Lives with - Patient's Self Care Capacity: Spouse, Child Less than 18 Years of Age  Patient or legal guardian wants to designate a caregiver (see row info): No  Support Systems: Spouse / Significant Other  Housing / Facility: Mobile Home  Do You Take your Prescribed Medications Regularly: No, No PCP  Able to Return to Previous ADL's: Yes  Mobility Issues: No  Prior Services: Home-Independent  Patient Expects to be Discharged to:: Home  Assistance Needed: No  Durable Medical Equipment: Not Applicable    Discharge Preparedness  What are your discharge supports?: Spouse  Prior Functional Level: Ambulatory    Functional Assesment  Prior Functional Level: Ambulatory    Finances  Prescription Coverage: Yes    Anticipated Discharge Information  Anticipated discharge disposition: Home  Discharge Address: [address] [place] [address]  Discharge Contact Phone Number: [phone]

## 2020-06-18 NOTE — DISCHARGE SUMMARY
Discharge Summary    CHIEF COMPLAINT ON ADMISSION  Chief Complaint   Patient presents with   • Syncope       Reason for Admission  EMS     Admission Date  6/17/2020    CODE STATUS  Full Code    HPI & HOSPITAL COURSE  This is a 34 y.o. female with history of chronically low blood pressure in the 90s, admitted 6/17/2020 with feeling dizzy and off after working excessive hours with very little sleep, and having a syncopal episode.  She also felt that her heart was racing.  Initial blood work-up was remarkable for sodium of 134, potassium 3.5.  Troponin was mildly elevated at 64.  EKG showed sinus rhythm.  SVT was suspected, and patient was monitored on telemetry.  Echocardiogram was ordered.  Her potassium was replaced.     Her troponin did go up to 89.  Cardiology was consulted, who felt that her syncopal episode was vasovagal in etiology.  For her elevated troponin, she had a treadmill cardiac stress test done which showed negative stress echocardiogram for ischemia with adequate stress achieved by   heart rate criteria.. Echocardiogram showed EF of 65%, with no significant valvular abnormalities. She was monitored on telemetry, which did not show any arrhythmias.  Her troponin did not further rise.  Cardiology recommended outpatient ZIO patch, and outpatient follow-up, which were arranged.    Otherwise, she remained hemodynamically stable and afebrile. She also had no further recurrence of her palpitations.  She has been cleared for discharge by cardiology. I have personally seen and examined the patient on the day of discharge. With her clinical improvement, she was deemed ready to discharge from the hospital as she did not have any further hospitalization needs. Patient felt comfortable going home. The discharge plan was discussed with the patient, with which she was agreeable to.     Therefore, she is discharged in good and stable condition to home with close outpatient follow-up.      Discharge  Date  6/18/2020      FOLLOW UP ITEMS POST DISCHARGE  -She will have a Z[facility] patch placed at [facility] [place].  She will also follow-up with outpatient cardiology.  -She is counseled to avoid stimulants such as energy drinks, caffeinated drinks and food.  -Follow-up up with PCP.  - counseled to seek immediate medical attention, or return to the ED for recurrent or worsening symptoms.      DISCHARGE DIAGNOSES  Principal Problem:    Syncope, likely vasovagal POA: Yes  Active Problems:    Palpitations POA: Yes    Troponin level elevated POA: Yes  Resolved Problems:    * No resolved hospital problems. *      FOLLOW UP  Future Appointments   Date Time Provider Department Center   7/9/2020  3:30 PM [facility] None   8/6/2020  9:00 AM ALESHIA Lobato [facility] None     No follow-up provider specified.    MEDICATIONS ON DISCHARGE     Medication List      CONTINUE taking these medications      Instructions   Vitamin D-3 25 MCG (1000 UT) Caps   Take 1,000 Units by mouth every morning.  Dose:  1,000 Units            Allergies  No Known Allergies    DIET  Orders Placed This Encounter   Procedures   • Diet Order Regular     Standing Status:   Standing     Number of Occurrences:   1     Order Specific Question:   Diet:     Answer:   Regular [1]       ACTIVITY  As tolerated.  Weight bearing as tolerated    CONSULTATIONS  Cardiology    PROCEDURES  As above    LABORATORY  Lab Results   Component Value Date    SODIUM 134 (L) 06/17/2020    POTASSIUM 3.5 (L) 06/17/2020    CHLORIDE 105 06/17/2020    CO2 20 06/17/2020    GLUCOSE 88 06/17/2020    BUN 12 06/17/2020    CREATININE 0.67 06/17/2020        Lab Results   Component Value Date    WBC 7.6 06/17/2020    HEMOGLOBIN 13.7 06/17/2020    HEMATOCRIT 41.2 06/17/2020    PLATELETCT 218 06/17/2020        Total time of the discharge process = 39 minutes.

## 2020-06-18 NOTE — CONSULTS
"CARDIAC CONSULTATION    Requesting Provider: Farshad Mercedes M.D.  Reason for consultation: elevated troponin    Impressions:  #. Acute myocardial injury- likely related to vasovagal syncope, possible tachyarrhythmia  #. Syncope, most suspicious for vasovagal spell  #. Palpitations preceding syncope    Recommendations:  Await next troponin.  If flat or down-trending then a stress echo will be arranged    ZIO patch after discharge    Will follow up with myself in the office in 4-8 weeks    History: Amy Charles is a 34 y.o. woman who I have been consulted regarding syncope and elevated troponin.  The patient was in her usual state of health until yesterday when she developed palpitations and lightheadedness while sitting at her workstation.  She was wearing a mask at the time and felt warm.  She removed this and got up out of her seat.  She developed gradual increase in weakness until losing consciousness.  She was caught by a coworker and so did not sustain any injury.  Upon awakening she was groggy and disoriented but gradually came to.  In the emergency department a EKG was normal aside from nonspecific T wave inversion in V3 and V2.  Also, an echocardiogram was normal.  The troponin however janelle from a level of 18 to now 89.  She has had minor recurrence of the symptoms when her heart rate was noted to transiently dropped to the 40s.  Overall these are mild.  She has not had any symptoms of exertional intolerance.  She does not have any strong family history of coronary artery disease.  Lipids are measured and normal.    She is a mother to 3 children ages 5 through 12 and although does not regularly engage in structured exercise she does get activity when with them and feels no cardiovascular symptoms.    ROS:  All other systems reviewed and negative except as per the HPI    PE:  BP (!) 99/57   Pulse 62   Temp 36.5 °C (97.7 °F) (Temporal)   Resp 20   Ht 1.6 m (5' 3\")   Wt 59.5 kg (131 lb 2.8 " oz)   SpO2 99%   BMI 23.24 kg/m²   GEN: Well appearing  HEENT: Symmetric face. Anicteric sclerae. Moist mucus membranes  NECK: No JVD. No lymphadenopathy  CARDIAC: Normal PMI, regular, normal S1, S2.   VASCULATURE: Normal carotid amplitude without bruit. Peripheral pulses normal  RESP: Clear to auscultation bilaterally  ABD: Soft, non-tender, non-distended  EXT: No edema, no clubbing or cyanosis  SKIN: Warm and dry  NEURO: No gross deficits  PSYCH: Appropriate affect, participates in conversation      Past Medical History:   Diagnosis Date   • Anxiety    • Bowel habit changes     constipation   • Depression    • Headache(784.0)    • IBD (inflammatory bowel disease)    • Pain     lower back     Past Surgical History:   Procedure Laterality Date   • LARYNGOSCOPY N/A 1/29/2020    Procedure: LARYNGOSCOPY- DIRECT WITH TONGUE BIOPSY;  Surgeon: Kami Moreira M.D.;  Location: SURGERY SAME DAY [facility] ORS;  Service: Ent   • TONSILLECTOMY AND ADENOIDECTOMY  12/5/2018    Procedure: TONSILLECTOMY AND ADENOIDECTOMY;  Surgeon: Kami Moreira M.D.;  Location: SURGERY SAME DAY [facility] ORS;  Service: Ent   • DENTAL EXTRACTION(S)       No Known Allergies      Current Facility-Administered Medications:   •  senna-docusate (PERICOLACE or SENOKOT S) 8.6-50 MG per tablet 2 Tab, 2 Tab, Oral, BID **AND** polyethylene glycol/lytes (MIRALAX) PACKET 1 Packet, 1 Packet, Oral, QDAY PRN **AND** magnesium hydroxide (MILK OF MAGNESIA) suspension 30 mL, 30 mL, Oral, QDAY PRN **AND** bisacodyl (DULCOLAX) suppository 10 mg, 10 mg, Rectal, QDAY PRN, Hilario Doe M.D.  •  enoxaparin (LOVENOX) inj 40 mg, 40 mg, Subcutaneous, DAILY, Hilario Doe M.D., 40 mg at 06/18/20 0524  •  acetaminophen (TYLENOL) tablet 650 mg, 650 mg, Oral, Q6HRS PRN, Hilario Doe M.D.  •  ondansetron (ZOFRAN) syringe/vial injection 4 mg, 4 mg, Intravenous, Q4HRS PRN, Hilario M Nader, M.D.  •  ondansetron (ZOFRAN ODT) dispertab 4 mg, 4 mg, Oral, Q4HRS PRN, Hilario IRBY  ANGELIQUE Doe  •  promethazine (PHENERGAN) tablet 12.5-25 mg, 12.5-25 mg, Oral, Q4HRS PRN, Hilario Doe M.D.  •  promethazine (PHENERGAN) suppository 12.5-25 mg, 12.5-25 mg, Rectal, Q4HRS PRN, Hilario Doe M.D.  •  prochlorperazine (COMPAZINE) injection 5-10 mg, 5-10 mg, Intravenous, Q4HRS PRN, Hilario Doe M.D.  Medications Prior to Admission   Medication Sig Dispense Refill Last Dose   • Cholecalciferol (VITAMIN D-3) 25 MCG (1000 UT) Cap Take 1,000 Units by mouth every morning.   > 3 DAYS at AM      Social History     Socioeconomic History   • Marital status:      Spouse name: Not on file   • Number of children: Not on file   • Years of education: Not on file   • Highest education level: Not on file   Occupational History   • Not on file   Social Needs   • Financial resource strain: Not on file   • Food insecurity     Worry: Not on file     Inability: Not on file   • Transportation needs     Medical: Not on file     Non-medical: Not on file   Tobacco Use   • Smoking status: Never Smoker   • Smokeless tobacco: Never Used   Substance and Sexual Activity   • Alcohol use: No     Alcohol/week: 0.5 oz     Types: 1 Cans of beer per week   • Drug use: No   • Sexual activity: Yes     Partners: Male     Birth control/protection: I.U.D.     Comment:    Lifestyle   • Physical activity     Days per week: Not on file     Minutes per session: Not on file   • Stress: Not on file   Relationships   • Social connections     Talks on phone: Not on file     Gets together: Not on file     Attends Gnosticism service: Not on file     Active member of club or organization: Not on file     Attends meetings of clubs or organizations: Not on file     Relationship status: Not on file   • Intimate partner violence     Fear of current or ex partner: Not on file     Emotionally abused: Not on file     Physically abused: Not on file     Forced sexual activity: Not on file   Other Topics Concern   • Not on file   Social History  Narrative   • Not on file       Family History   Problem Relation Age of Onset   • Hypertension Mother    • Hyperlipidemia Mother    • Heart Disease Mother    • Diabetes Maternal Aunt    • Heart Disease Maternal Aunt    • Hypertension Maternal Aunt    • Hyperlipidemia Maternal Aunt    • Hyperlipidemia Maternal Uncle    • Hypertension Maternal Uncle    • Heart Disease Maternal Uncle    • Diabetes Maternal Uncle    • Arthritis Maternal Grandmother    • Diabetes Maternal Grandmother    • Hypertension Maternal Grandmother    • Hyperlipidemia Maternal Grandmother    • Heart Disease Maternal Grandmother    • Lung Disease Maternal Grandfather           Studies  Lab Results   Component Value Date/Time    CHOLSTRLTOT 158 10/10/2017 06:11 AM    LDL 82 10/10/2017 06:11 AM    HDL 67 10/10/2017 06:11 AM    TRIGLYCERIDE 45 10/10/2017 06:11 AM       Lab Results   Component Value Date/Time    SODIUM 134 (L) 06/17/2020 07:30 AM    POTASSIUM 3.5 (L) 06/17/2020 07:30 AM    CHLORIDE 105 06/17/2020 07:30 AM    CO2 20 06/17/2020 07:30 AM    GLUCOSE 88 06/17/2020 07:30 AM    BUN 12 06/17/2020 07:30 AM    CREATININE 0.67 06/17/2020 07:30 AM     Lab Results   Component Value Date/Time    ALKPHOSPHAT 61 06/17/2020 07:30 AM    ASTSGOT 17 06/17/2020 07:30 AM    ALTSGPT 19 06/17/2020 07:30 AM    TBILIRUBIN 0.2 06/17/2020 07:30 AM      No results found for: BNPBTYPENAT    For this encounter I directly reviewed ECG tracings, Echo images, telemetry tracings and medical records    Case discussed with Dr. Mercedes

## 2020-06-18 NOTE — PROGRESS NOTES
A/o, respirations are even and unlabored on room air,assessment completed, vital signs stable except for low /50-per pt baseline, SB on the monitor [id number],  updated communication board, poc discussed and understood, verbalized understanding, all questions answered at this time , fall precautions in place, call button within reach, will continue to monitor

## 2021-08-18 NOTE — ANESTHESIA PREPROCEDURE EVALUATION
35 y/o female with assymetric tongue and small mass at the back of the tongue found on imaging after c/o throat pain. No problems with anesthesia in the past, easy intubation for T&A. No other healthy hx, no HTN, asthma, no substance abuse.    Relevant Problems   Other   (+) Chronic tonsillitis       Physical Exam    Airway   Mallampati: II  TM distance: >3 FB  Neck ROM: full       Cardiovascular - normal exam  Rhythm: regular  Rate: normal     Dental - normal exam         Pulmonary   Breath sounds clear to auscultation     Abdominal    Neurological - normal exam                 Anesthesia Plan    ASA 2       Plan - general       Airway plan will be ETT  (Small ETT 6.0 cuffed)      Induction: intravenous          Informed Consent:    Anesthetic plan and risks discussed with patient.         Self

## (undated) DEVICE — LACTATED RINGERS INJ 1000 ML - (14EA/CA 60CA/PF)

## (undated) DEVICE — MEDICINE CUP STERILE 2 OZ - (100/CA)

## (undated) DEVICE — TUBING CLEARLINK DUO-VENT - C-FLO (48EA/CA)

## (undated) DEVICE — CANISTER SUCTION RIGID RED 1500CC (40EA/CA)

## (undated) DEVICE — MASK ANESTHESIA ADULT  - (100/CA)

## (undated) DEVICE — Device

## (undated) DEVICE — HEAD HOLDER JUNIOR/ADULT

## (undated) DEVICE — SODIUM CHL IRRIGATION 0.9% 1000ML (12EA/CA)

## (undated) DEVICE — ELECTRODE DUAL RETURN W/ CORD - (50/PK)

## (undated) DEVICE — CANISTER SUCTION 3000ML MECHANICAL FILTER AUTO SHUTOFF MEDI-VAC NONSTERILE LF DISP  (40EA/CA)

## (undated) DEVICE — SUCTION INSTRUMENT YANKAUER BULBOUS TIP W/O VENT (50EA/CA)

## (undated) DEVICE — CIRCUIT VENTILATOR PEDIATRIC WITH FILTER  (20EA/CS)

## (undated) DEVICE — SET LEADWIRE 5 LEAD BEDSIDE DISPOSABLE ECG (1SET OF 5/EA)

## (undated) DEVICE — ELECTRODE 850 FOAM ADHESIVE - HYDROGEL RADIOTRNSPRNT (50/PK)

## (undated) DEVICE — PATTIES SURG NEURO X-RAY 1/2X1/2 (10EA/PK 20PK/CS)

## (undated) DEVICE — SLEEVE, VASO, THIGH, MED

## (undated) DEVICE — DRESSING NASOPORE 8CM STD - (8EA/PK)

## (undated) DEVICE — TEETHGUARD ENT -2BX MIN ORDER- (6EA/BX)

## (undated) DEVICE — WATER IRRIGATION STERILE 1000ML (12EA/CA)

## (undated) DEVICE — SUTURE GENERAL

## (undated) DEVICE — GLOVE BIOGEL SZ 7 SURGICAL PF LTX - (50PR/BX 4BX/CA)

## (undated) DEVICE — CATHETER IV 20 GA X 1-1/4 ---SURG.& SDS ONLY--- (50EA/BX)

## (undated) DEVICE — GOWN WARMING STANDARD FLEX - (30/CA)

## (undated) DEVICE — PACK ENT OR - (2EA/CA)

## (undated) DEVICE — CONTAINER, SPECIMEN, STERILE

## (undated) DEVICE — TUBE CONNECTING SUCTION - CLEAR PLASTIC STERILE 72 IN (50EA/CA)

## (undated) DEVICE — SPONGE TONSIL MEDIUM XRAY STERILE 1 - (5/PK 20PK/CA)"

## (undated) DEVICE — MASK, PEDIATRIC AEROSOL

## (undated) DEVICE — NEPTUNE 4 PORT MANIFOLD - (20/PK)

## (undated) DEVICE — PROBE ENT ORAL LPT1635FN - (10/BX)

## (undated) DEVICE — CATHETER IV SAFETY 22 GA X 1 (50EA/BX)

## (undated) DEVICE — SENSOR SPO2 NEO LNCS ADHESIVE (20/BX) SEE USER NOTES

## (undated) DEVICE — CATHETER SUCTION 14 FR. WITH CONTROL PORT (100/CS)

## (undated) DEVICE — SPONGE TONSIL LARGE XRAY STERILE - (5/PK 20PK/CA)

## (undated) DEVICE — PROTECTOR ULNA NERVE - (36PR/CA)

## (undated) DEVICE — ANTI-FOG SOLUTION - 60BTL/CA

## (undated) DEVICE — KIT ANESTHESIA W/CIRCUIT & 3/LT BAG W/FILTER (20EA/CA)

## (undated) DEVICE — TOWELS CLOTH SURGICAL - (4/PK 20PK/CA)

## (undated) DEVICE — TRANSDUCER OXISENSOR PEDS O2 - (20EA/BX)

## (undated) DEVICE — TUBE SHILEY ENDOTRACHEAL ORAL RAE CUFFED 7.0MM WITH TAPERGUARD (10EA/PK)

## (undated) DEVICE — KIT  I.V. START (100EA/CA)

## (undated) DEVICE — GLOVE BIOGEL PI INDICATOR SZ 7.5 SURGICAL PF LF -(50/BX 4BX/CA)

## (undated) DEVICE — PAD GROUNDING BOVIE PEDS - (25/CA)

## (undated) DEVICE — SYRINGE EAR/NOSE 3 OZ STERILE (50/CA

## (undated) DEVICE — TUBE E-T HI-LO CUFF 6.0MM (10/PK)